# Patient Record
Sex: MALE | Race: WHITE | NOT HISPANIC OR LATINO | Employment: FULL TIME | URBAN - METROPOLITAN AREA
[De-identification: names, ages, dates, MRNs, and addresses within clinical notes are randomized per-mention and may not be internally consistent; named-entity substitution may affect disease eponyms.]

---

## 2017-04-13 ENCOUNTER — OFFICE VISIT - HEALTHEAST (OUTPATIENT)
Dept: FAMILY MEDICINE | Facility: CLINIC | Age: 52
End: 2017-04-13

## 2017-04-13 DIAGNOSIS — I10 ESSENTIAL HYPERTENSION: ICD-10-CM

## 2017-04-13 DIAGNOSIS — Z00.00 ROUTINE ADULT HEALTH MAINTENANCE: ICD-10-CM

## 2017-04-13 DIAGNOSIS — Z23 NEED FOR TDAP VACCINATION: ICD-10-CM

## 2017-04-13 ASSESSMENT — MIFFLIN-ST. JEOR: SCORE: 1776.69

## 2018-02-14 ENCOUNTER — AMBULATORY - HEALTHEAST (OUTPATIENT)
Dept: NURSING | Facility: CLINIC | Age: 53
End: 2018-02-14

## 2018-02-14 DIAGNOSIS — Z23 NEED FOR INFLUENZA VACCINATION: ICD-10-CM

## 2018-03-29 ENCOUNTER — COMMUNICATION - HEALTHEAST (OUTPATIENT)
Dept: FAMILY MEDICINE | Facility: CLINIC | Age: 53
End: 2018-03-29

## 2018-03-29 DIAGNOSIS — I10 ESSENTIAL HYPERTENSION: ICD-10-CM

## 2018-07-01 ENCOUNTER — COMMUNICATION - HEALTHEAST (OUTPATIENT)
Dept: FAMILY MEDICINE | Facility: CLINIC | Age: 53
End: 2018-07-01

## 2018-07-01 DIAGNOSIS — I10 ESSENTIAL HYPERTENSION: ICD-10-CM

## 2018-08-10 ENCOUNTER — COMMUNICATION - HEALTHEAST (OUTPATIENT)
Dept: FAMILY MEDICINE | Facility: CLINIC | Age: 53
End: 2018-08-10

## 2018-08-10 DIAGNOSIS — I10 ESSENTIAL HYPERTENSION: ICD-10-CM

## 2018-08-17 ENCOUNTER — COMMUNICATION - HEALTHEAST (OUTPATIENT)
Dept: FAMILY MEDICINE | Facility: CLINIC | Age: 53
End: 2018-08-17

## 2018-09-05 ENCOUNTER — OFFICE VISIT - HEALTHEAST (OUTPATIENT)
Dept: FAMILY MEDICINE | Facility: CLINIC | Age: 53
End: 2018-09-05

## 2018-09-05 DIAGNOSIS — N52.9 ED (ERECTILE DYSFUNCTION): ICD-10-CM

## 2018-09-05 DIAGNOSIS — Z12.11 SCREEN FOR COLON CANCER: ICD-10-CM

## 2018-09-05 DIAGNOSIS — E66.811 CLASS 1 OBESITY WITHOUT SERIOUS COMORBIDITY WITH BODY MASS INDEX (BMI) OF 30.0 TO 30.9 IN ADULT, UNSPECIFIED OBESITY TYPE: ICD-10-CM

## 2018-09-05 DIAGNOSIS — I10 ESSENTIAL HYPERTENSION: ICD-10-CM

## 2018-09-05 DIAGNOSIS — Z00.00 ROUTINE PHYSICAL EXAMINATION: ICD-10-CM

## 2018-09-05 DIAGNOSIS — J30.2 SEASONAL ALLERGIES: ICD-10-CM

## 2018-09-05 LAB
ALBUMIN SERPL-MCNC: 4.7 G/DL (ref 3.5–5)
ALP SERPL-CCNC: 83 U/L (ref 45–120)
ALT SERPL W P-5'-P-CCNC: 37 U/L (ref 0–45)
ANION GAP SERPL CALCULATED.3IONS-SCNC: 12 MMOL/L (ref 5–18)
AST SERPL W P-5'-P-CCNC: 31 U/L (ref 0–40)
BILIRUB SERPL-MCNC: 0.6 MG/DL (ref 0–1)
BUN SERPL-MCNC: 14 MG/DL (ref 8–22)
CALCIUM SERPL-MCNC: 10.3 MG/DL (ref 8.5–10.5)
CHLORIDE BLD-SCNC: 103 MMOL/L (ref 98–107)
CHOLEST SERPL-MCNC: 196 MG/DL
CO2 SERPL-SCNC: 22 MMOL/L (ref 22–31)
CREAT SERPL-MCNC: 0.89 MG/DL (ref 0.7–1.3)
ERYTHROCYTE [DISTWIDTH] IN BLOOD BY AUTOMATED COUNT: 11.3 % (ref 11–14.5)
FASTING STATUS PATIENT QL REPORTED: ABNORMAL
GFR SERPL CREATININE-BSD FRML MDRD: >60 ML/MIN/1.73M2
GLUCOSE BLD-MCNC: 113 MG/DL (ref 70–125)
HCT VFR BLD AUTO: 49.4 % (ref 40–54)
HDLC SERPL-MCNC: 48 MG/DL
HGB BLD-MCNC: 16.9 G/DL (ref 14–18)
LDLC SERPL CALC-MCNC: 114 MG/DL
MCH RBC QN AUTO: 32.6 PG (ref 27–34)
MCHC RBC AUTO-ENTMCNC: 34.2 G/DL (ref 32–36)
MCV RBC AUTO: 96 FL (ref 80–100)
PLATELET # BLD AUTO: 280 THOU/UL (ref 140–440)
PMV BLD AUTO: 7.9 FL (ref 7–10)
POTASSIUM BLD-SCNC: 4.7 MMOL/L (ref 3.5–5)
PROT SERPL-MCNC: 7.5 G/DL (ref 6–8)
PSA SERPL-MCNC: 2 NG/ML (ref 0–3.5)
RBC # BLD AUTO: 5.18 MILL/UL (ref 4.4–6.2)
SODIUM SERPL-SCNC: 137 MMOL/L (ref 136–145)
TRIGL SERPL-MCNC: 168 MG/DL
WBC: 5.4 THOU/UL (ref 4–11)

## 2018-09-05 ASSESSMENT — MIFFLIN-ST. JEOR: SCORE: 1794.83

## 2018-09-06 ENCOUNTER — AMBULATORY - HEALTHEAST (OUTPATIENT)
Dept: FAMILY MEDICINE | Facility: CLINIC | Age: 53
End: 2018-09-06

## 2018-09-06 ENCOUNTER — COMMUNICATION - HEALTHEAST (OUTPATIENT)
Dept: FAMILY MEDICINE | Facility: CLINIC | Age: 53
End: 2018-09-06

## 2018-09-06 DIAGNOSIS — Z00.00 HEALTH CARE MAINTENANCE: ICD-10-CM

## 2018-10-10 ENCOUNTER — RECORDS - HEALTHEAST (OUTPATIENT)
Dept: ADMINISTRATIVE | Facility: OTHER | Age: 53
End: 2018-10-10

## 2018-11-18 ENCOUNTER — COMMUNICATION - HEALTHEAST (OUTPATIENT)
Dept: FAMILY MEDICINE | Facility: CLINIC | Age: 53
End: 2018-11-18

## 2018-11-18 DIAGNOSIS — I10 ESSENTIAL HYPERTENSION, BENIGN: ICD-10-CM

## 2018-11-21 ENCOUNTER — COMMUNICATION - HEALTHEAST (OUTPATIENT)
Dept: FAMILY MEDICINE | Facility: CLINIC | Age: 53
End: 2018-11-21

## 2019-01-23 ENCOUNTER — COMMUNICATION - HEALTHEAST (OUTPATIENT)
Dept: FAMILY MEDICINE | Facility: CLINIC | Age: 54
End: 2019-01-23

## 2019-01-23 DIAGNOSIS — N52.9 ED (ERECTILE DYSFUNCTION): ICD-10-CM

## 2019-01-25 ENCOUNTER — AMBULATORY - HEALTHEAST (OUTPATIENT)
Dept: NURSING | Facility: CLINIC | Age: 54
End: 2019-01-25

## 2019-01-25 ENCOUNTER — AMBULATORY - HEALTHEAST (OUTPATIENT)
Dept: FAMILY MEDICINE | Facility: CLINIC | Age: 54
End: 2019-01-25

## 2019-01-25 DIAGNOSIS — Z23 NEED FOR SHINGLES VACCINE: ICD-10-CM

## 2019-02-27 ENCOUNTER — COMMUNICATION - HEALTHEAST (OUTPATIENT)
Dept: FAMILY MEDICINE | Facility: CLINIC | Age: 54
End: 2019-02-27

## 2019-03-29 ENCOUNTER — COMMUNICATION - HEALTHEAST (OUTPATIENT)
Dept: FAMILY MEDICINE | Facility: CLINIC | Age: 54
End: 2019-03-29

## 2019-06-07 ENCOUNTER — COMMUNICATION - HEALTHEAST (OUTPATIENT)
Dept: FAMILY MEDICINE | Facility: CLINIC | Age: 54
End: 2019-06-07

## 2019-08-07 ENCOUNTER — COMMUNICATION - HEALTHEAST (OUTPATIENT)
Dept: FAMILY MEDICINE | Facility: CLINIC | Age: 54
End: 2019-08-07

## 2019-09-05 ENCOUNTER — COMMUNICATION - HEALTHEAST (OUTPATIENT)
Dept: SCHEDULING | Facility: CLINIC | Age: 54
End: 2019-09-05

## 2019-09-05 DIAGNOSIS — I10 ESSENTIAL HYPERTENSION, BENIGN: ICD-10-CM

## 2019-09-25 ENCOUNTER — OFFICE VISIT - HEALTHEAST (OUTPATIENT)
Dept: FAMILY MEDICINE | Facility: CLINIC | Age: 54
End: 2019-09-25

## 2019-09-25 DIAGNOSIS — Z00.00 HEALTH CARE MAINTENANCE: ICD-10-CM

## 2019-09-25 DIAGNOSIS — I10 ESSENTIAL HYPERTENSION, BENIGN: ICD-10-CM

## 2019-09-25 DIAGNOSIS — N52.9 ERECTILE DYSFUNCTION, UNSPECIFIED ERECTILE DYSFUNCTION TYPE: ICD-10-CM

## 2019-09-25 LAB
ALBUMIN SERPL-MCNC: 4.7 G/DL (ref 3.5–5)
ALP SERPL-CCNC: 84 U/L (ref 45–120)
ALT SERPL W P-5'-P-CCNC: 35 U/L (ref 0–45)
ANION GAP SERPL CALCULATED.3IONS-SCNC: 12 MMOL/L (ref 5–18)
AST SERPL W P-5'-P-CCNC: 28 U/L (ref 0–40)
BILIRUB SERPL-MCNC: 0.6 MG/DL (ref 0–1)
BUN SERPL-MCNC: 17 MG/DL (ref 8–22)
CALCIUM SERPL-MCNC: 10.4 MG/DL (ref 8.5–10.5)
CHLORIDE BLD-SCNC: 102 MMOL/L (ref 98–107)
CHOLEST SERPL-MCNC: 193 MG/DL
CO2 SERPL-SCNC: 23 MMOL/L (ref 22–31)
CREAT SERPL-MCNC: 1.06 MG/DL (ref 0.7–1.3)
ERYTHROCYTE [DISTWIDTH] IN BLOOD BY AUTOMATED COUNT: 10.7 % (ref 11–14.5)
FASTING STATUS PATIENT QL REPORTED: YES
GFR SERPL CREATININE-BSD FRML MDRD: >60 ML/MIN/1.73M2
GLUCOSE BLD-MCNC: 110 MG/DL (ref 70–125)
HCT VFR BLD AUTO: 46.9 % (ref 40–54)
HDLC SERPL-MCNC: 45 MG/DL
HGB BLD-MCNC: 16.1 G/DL (ref 14–18)
LDLC SERPL CALC-MCNC: 75 MG/DL
MCH RBC QN AUTO: 33.2 PG (ref 27–34)
MCHC RBC AUTO-ENTMCNC: 34.3 G/DL (ref 32–36)
MCV RBC AUTO: 97 FL (ref 80–100)
PLATELET # BLD AUTO: 258 THOU/UL (ref 140–440)
PMV BLD AUTO: 7.8 FL (ref 7–10)
POTASSIUM BLD-SCNC: 4.8 MMOL/L (ref 3.5–5)
PROT SERPL-MCNC: 7.6 G/DL (ref 6–8)
PSA SERPL-MCNC: 2.1 NG/ML (ref 0–3.5)
RBC # BLD AUTO: 4.84 MILL/UL (ref 4.4–6.2)
SODIUM SERPL-SCNC: 137 MMOL/L (ref 136–145)
TRIGL SERPL-MCNC: 364 MG/DL
WBC: 4.8 THOU/UL (ref 4–11)

## 2019-10-01 ENCOUNTER — COMMUNICATION - HEALTHEAST (OUTPATIENT)
Dept: FAMILY MEDICINE | Facility: CLINIC | Age: 54
End: 2019-10-01

## 2019-10-01 DIAGNOSIS — E78.1 HIGH TRIGLYCERIDES: ICD-10-CM

## 2019-10-09 ENCOUNTER — COMMUNICATION - HEALTHEAST (OUTPATIENT)
Dept: FAMILY MEDICINE | Facility: CLINIC | Age: 54
End: 2019-10-09

## 2019-10-09 ENCOUNTER — AMBULATORY - HEALTHEAST (OUTPATIENT)
Dept: NURSING | Facility: CLINIC | Age: 54
End: 2019-10-09

## 2019-10-09 DIAGNOSIS — I10 ESSENTIAL HYPERTENSION, BENIGN: ICD-10-CM

## 2020-03-04 ENCOUNTER — COMMUNICATION - HEALTHEAST (OUTPATIENT)
Dept: FAMILY MEDICINE | Facility: CLINIC | Age: 55
End: 2020-03-04

## 2020-03-04 DIAGNOSIS — N52.9 ED (ERECTILE DYSFUNCTION): ICD-10-CM

## 2020-03-04 DIAGNOSIS — I10 ESSENTIAL HYPERTENSION, BENIGN: ICD-10-CM

## 2020-05-26 ENCOUNTER — COMMUNICATION - HEALTHEAST (OUTPATIENT)
Dept: FAMILY MEDICINE | Facility: CLINIC | Age: 55
End: 2020-05-26

## 2020-05-26 DIAGNOSIS — I10 ESSENTIAL HYPERTENSION, BENIGN: ICD-10-CM

## 2020-11-24 ENCOUNTER — COMMUNICATION - HEALTHEAST (OUTPATIENT)
Dept: FAMILY MEDICINE | Facility: CLINIC | Age: 55
End: 2020-11-24

## 2020-11-24 DIAGNOSIS — I10 ESSENTIAL HYPERTENSION, BENIGN: ICD-10-CM

## 2020-11-24 DIAGNOSIS — N52.9 ED (ERECTILE DYSFUNCTION): ICD-10-CM

## 2020-12-23 ENCOUNTER — OFFICE VISIT - HEALTHEAST (OUTPATIENT)
Dept: FAMILY MEDICINE | Facility: CLINIC | Age: 55
End: 2020-12-23

## 2020-12-23 DIAGNOSIS — I10 ESSENTIAL HYPERTENSION: ICD-10-CM

## 2020-12-23 DIAGNOSIS — Z00.00 ROUTINE HISTORY AND PHYSICAL EXAMINATION OF ADULT: ICD-10-CM

## 2020-12-23 DIAGNOSIS — J30.2 SEASONAL ALLERGIC RHINITIS, UNSPECIFIED TRIGGER: ICD-10-CM

## 2020-12-23 DIAGNOSIS — Z23 NEEDS FLU SHOT: ICD-10-CM

## 2020-12-23 LAB
ALBUMIN SERPL-MCNC: 4.6 G/DL (ref 3.5–5)
ALP SERPL-CCNC: 88 U/L (ref 45–120)
ALT SERPL W P-5'-P-CCNC: 40 U/L (ref 0–45)
ANION GAP SERPL CALCULATED.3IONS-SCNC: 12 MMOL/L (ref 5–18)
AST SERPL W P-5'-P-CCNC: 27 U/L (ref 0–40)
BILIRUB SERPL-MCNC: 0.5 MG/DL (ref 0–1)
BUN SERPL-MCNC: 16 MG/DL (ref 8–22)
CALCIUM SERPL-MCNC: 10 MG/DL (ref 8.5–10.5)
CHLORIDE BLD-SCNC: 103 MMOL/L (ref 98–107)
CHOLEST SERPL-MCNC: 198 MG/DL
CO2 SERPL-SCNC: 24 MMOL/L (ref 22–31)
CREAT SERPL-MCNC: 1.06 MG/DL (ref 0.7–1.3)
ERYTHROCYTE [DISTWIDTH] IN BLOOD BY AUTOMATED COUNT: 11.5 % (ref 11–14.5)
FASTING STATUS PATIENT QL REPORTED: YES
GFR SERPL CREATININE-BSD FRML MDRD: >60 ML/MIN/1.73M2
GLUCOSE BLD-MCNC: 114 MG/DL (ref 70–125)
HCT VFR BLD AUTO: 45.7 % (ref 40–54)
HDLC SERPL-MCNC: 38 MG/DL
HGB BLD-MCNC: 15.8 G/DL (ref 14–18)
LDLC SERPL CALC-MCNC: 95 MG/DL
MCH RBC QN AUTO: 32.9 PG (ref 27–34)
MCHC RBC AUTO-ENTMCNC: 34.6 G/DL (ref 32–36)
MCV RBC AUTO: 95 FL (ref 80–100)
PLATELET # BLD AUTO: 277 THOU/UL (ref 140–440)
PMV BLD AUTO: 8.1 FL (ref 7–10)
POTASSIUM BLD-SCNC: 4.6 MMOL/L (ref 3.5–5)
PROT SERPL-MCNC: 7.5 G/DL (ref 6–8)
PSA SERPL-MCNC: 2.4 NG/ML (ref 0–3.5)
RBC # BLD AUTO: 4.81 MILL/UL (ref 4.4–6.2)
SODIUM SERPL-SCNC: 139 MMOL/L (ref 136–145)
TRIGL SERPL-MCNC: 324 MG/DL
WBC: 4.7 THOU/UL (ref 4–11)

## 2020-12-23 ASSESSMENT — MIFFLIN-ST. JEOR: SCORE: 1807.53

## 2021-01-05 ENCOUNTER — COMMUNICATION - HEALTHEAST (OUTPATIENT)
Dept: FAMILY MEDICINE | Facility: CLINIC | Age: 56
End: 2021-01-05

## 2021-01-05 DIAGNOSIS — E78.2 MIXED HYPERLIPIDEMIA: ICD-10-CM

## 2021-01-07 RX ORDER — EZETIMIBE 10 MG/1
10 TABLET ORAL DAILY
Qty: 90 TABLET | Refills: 1 | Status: SHIPPED | OUTPATIENT
Start: 2021-01-07 | End: 2022-06-22

## 2021-01-26 ENCOUNTER — COMMUNICATION - HEALTHEAST (OUTPATIENT)
Dept: FAMILY MEDICINE | Facility: CLINIC | Age: 56
End: 2021-01-26

## 2021-01-26 DIAGNOSIS — I10 ESSENTIAL HYPERTENSION, BENIGN: ICD-10-CM

## 2021-01-27 RX ORDER — AMLODIPINE BESYLATE 5 MG/1
TABLET ORAL
Qty: 90 TABLET | Refills: 3 | Status: SHIPPED | OUTPATIENT
Start: 2021-01-27 | End: 2022-02-23

## 2021-01-27 RX ORDER — LISINOPRIL 40 MG/1
TABLET ORAL
Qty: 90 TABLET | Refills: 3 | Status: SHIPPED | OUTPATIENT
Start: 2021-01-27 | End: 2022-02-23

## 2021-03-16 ENCOUNTER — COMMUNICATION - HEALTHEAST (OUTPATIENT)
Dept: FAMILY MEDICINE | Facility: CLINIC | Age: 56
End: 2021-03-16

## 2021-03-16 DIAGNOSIS — N52.9 ED (ERECTILE DYSFUNCTION): ICD-10-CM

## 2021-03-17 RX ORDER — SILDENAFIL CITRATE 20 MG/1
TABLET ORAL
Qty: 90 TABLET | Refills: 3 | Status: SHIPPED | OUTPATIENT
Start: 2021-03-17 | End: 2022-03-21

## 2021-05-01 ENCOUNTER — HEALTH MAINTENANCE LETTER (OUTPATIENT)
Age: 56
End: 2021-05-01

## 2021-05-26 VITALS — HEART RATE: 100 BPM | DIASTOLIC BLOOD PRESSURE: 85 MMHG | SYSTOLIC BLOOD PRESSURE: 131 MMHG

## 2021-05-30 VITALS — WEIGHT: 206 LBS | BODY MASS INDEX: 29.49 KG/M2 | HEIGHT: 70 IN

## 2021-06-01 NOTE — TELEPHONE ENCOUNTER
----- Message from Guerrero Mercado MD sent at 9/30/2019  8:11 PM CDT -----  PSA stable- we will continue to check yearly.  Cholesterol levels are at goal range but triglycerides are elevated - I would recommend lowering fat and cholesterol intake in the diet and rechecking levels in 2 months- if still elevated we could discuss medication options.  Kidney and liver function are normal, no signs of diabetes.  No other concerns on blood work.

## 2021-06-01 NOTE — TELEPHONE ENCOUNTER
RN cannot approve Refill Request    Appointment scheduled 9/17/19- requesting short term supply to get him to that appointment    RN can NOT refill this medication PCP messaged that patient is overdue for Office Visit. Last office visit: Visit date not found Last Physical: Visit date not found Last MTM visit: Visit date not found Last visit same specialty: Visit date not found.  Next visit within 3 mo: Visit date not found  Next physical within 3 mo: Visit date not found      Katie Cm [facility] Triage/Med Refill 9/5/2019    Requested Prescriptions   Pending Prescriptions Disp Refills     lisinopril (PRINIVIL,ZESTRIL) 40 MG tablet 90 tablet 2     Sig: Take 1 tablet (40 mg total) by mouth daily.       Ace Inhibitors Refill Protocol Failed - 9/5/2019  4:10 PM        Failed - PCP or prescribing provider visit in past 12 months       Last office visit with prescriber/PCP: Visit date not found OR same dept: Visit date not found OR same specialty: Visit date not found  Last physical: Visit date not found Last MTM visit: Visit date not found   Next visit within 3 mo: Visit date not found  Next physical within 3 mo: Visit date not found  Prescriber OR PCP: Katie Cm RN  Last diagnosis associated with med order: 1. Benign Essential Hypertension  - lisinopril (PRINIVIL,ZESTRIL) 40 MG tablet; Take 1 tablet (40 mg total) by mouth daily.  Dispense: 90 tablet; Refill: 2    If protocol passes may refill for 12 months if within 3 months of last provider visit (or a total of 15 months).             Failed - Blood pressure filed in past 12 months     BP Readings from Last 1 Encounters:   09/05/18 (!) 148/97             Passed - Serum Potassium in past 12 months     No results found for: LN-POTASSIUM          Passed - Serum Creatinine in past 12 months     Creatinine   Date Value Ref Range Status   09/05/2018 0.89 0.70 - 1.30 mg/dL Final

## 2021-06-01 NOTE — TELEPHONE ENCOUNTER
Triage call:   Blood pressure meds- needs refill   Appointment scheduled for 9/17/19    Katie Cm RN [facility] Triage/Med Refill 9/5/2019 4:09 PM

## 2021-06-01 NOTE — TELEPHONE ENCOUNTER
Notified of results and recommendations. Letter mailed with results.  Lab appointment scheduled for 11/27/19.  Order pended for future lipid, please sign.

## 2021-06-01 NOTE — PROGRESS NOTES
ASSESSMENT/PLAN  1. Benign Essential Hypertension  Blood pressure not at goal range  We will continue with lisinopril but add 5 mg amlodipine  Return in 2 weeks for nurse blood pressure check adjust medications accordingly  Refill sent to the pharmacy  - amLODIPine (NORVASC) 5 MG tablet; Take 1 tablet (5 mg total) by mouth daily.  Dispense: 90 tablet; Refill: 1  - lisinopril (PRINIVIL,ZESTRIL) 40 MG tablet; Take 1 tablet (40 mg total) by mouth daily.  Dispense: 90 tablet; Refill: 1    2. Erectile dysfunction, unspecified erectile dysfunction type  Patient uses Revatio for erectile dysfunction  Refills upon request    3. Health care maintenance  Patient is due for fasting blood work which she is fasting today will obtain this and follow-up based on results  Patient is up-to-date on colon cancer screening  We will update with Shingrix No. 2 and flu vaccine today  - Lipid [place] FASTING  - Comprehensive Metabolic Panel  - PSA (Prostatic-Specific Antigen), Annual Screen  - HM2(CBC w/o Differential)        SUBJECTIVE:   Chief Complaint   Patient presents with     Blood Pressure Check     Currently taking Lisinopril 40 mg daily     Matias Rosas 53 y.o. male    Current Outpatient Medications   Medication Sig Dispense Refill     lisinopril (PRINIVIL,ZESTRIL) 40 MG tablet Take 1 tablet (40 mg total) by mouth daily. 90 tablet 1     sildenafil, antihypertensive, (REVATIO) 20 mg tablet TAKE 1 TABLET BY MOUTH ONCE DAILY AS NEEDED 30 tablet 8     ALLEGRA-D 12 HOUR  mg per tablet TAKE 1 TABLET BY MOUTH TWICE DAILY 60 tablet 0     amLODIPine (NORVASC) 5 MG tablet Take 1 tablet (5 mg total) by mouth daily. 90 tablet 1     No current facility-administered medications for this visit.      Allergies: Patient has no known allergies.   No LMP for male patient.    HPI:   Patient is here for medication follow-up  Patient takes Revatio for erectile dysfunction-medication is working well without side effects we will refill upon  request    Patient is on lisinopril 40 mg daily his blood pressure still elevated outside of goal range-discussed goal range of 130/80 with the patient, we will add amlodipine 5 mg daily  Patient is to return in 2 weeks for nurse blood pressure check and will adjust accordingly    Patient is up-to-date on colon cancer screening  Will obtain flu vaccine today and Shingrix No. 2  He is due for fasting blood work which will obtain today and follow-up based on results    ROS: negative except as per HPI    OBJECTIVE:   The patient appears well, alert, oriented x 3, in no distress.  BP (!) 156/95   Pulse 92   Temp 97.3  F (36.3  C) (Oral)   Resp 16   Wt 208 lb 3.2 oz (94.4 kg)   BMI 30.09 kg/m      Lungs: clear, good air entry, no wheezes, rhonchi or rales.   Cardiac: S1 and S2 normal, no murmurs, regular rate and rhythm.   Abdomen: normal bowel sounds, soft BMI 30  Extremities: show no edema, normal peripheral pulses.   Neurological: normal, no focal findings.  Skin: clear, dry, no rashes/lesions  Psych- normal mood and affect      Pt states an understanding and agrees to the above plan.

## 2021-06-02 ENCOUNTER — RECORDS - HEALTHEAST (OUTPATIENT)
Dept: ADMINISTRATIVE | Facility: CLINIC | Age: 56
End: 2021-06-02

## 2021-06-02 VITALS — HEIGHT: 70 IN | WEIGHT: 210 LBS | BODY MASS INDEX: 30.06 KG/M2

## 2021-06-02 NOTE — TELEPHONE ENCOUNTER
Reason contacted:  Bp check  Information relayed:  Relayed message below from   Additional questions:  No  Further follow-up needed:  No  Okay to leave a detailed message:  No

## 2021-06-02 NOTE — TELEPHONE ENCOUNTER
Left message to call back for: BP   Information to relay to patient:  LMTCB, please relay message from Dr Mercado

## 2021-06-02 NOTE — PROGRESS NOTES
Follow Up Blood Pressure Check    Matias Rosas is a 53 y.o. male recommended to follow up for blood pressure check by Guerrero Mercado MD. Anihypertensive medications and adherence were verified: Yes.     Reason for visit: New medication added 9/25/19   1. Benign Essential Hypertension  Blood pressure not at goal range  We will continue with lisinopril but add 5 mg amlodipine  Return in 2 weeks for nurse blood pressure check adjust medications accordingly    Medication change at last visit: yes see above     Today's Vitals:   Vitals:    10/09/19 1107 10/09/19 1117   BP: 137/83 131/85   Patient Site: Right Arm Right Arm   Patient Position: Sitting Sitting   Cuff Size: Adult Large Adult Large   Pulse: 95 100       Home blood pressure readings brought in today:   NO     Lowest blood pressure today is less than 140/90 and they deny signs or symptoms of new onset: severe headache, fatigue, confusion, vision changes, chest pain, pounding in the chest, neck, ears, irregular heartbeat, difficulty breathing and blood in the urine.  Please inform patient of his/her blood pressure today.  If they are asymptomatic, the patient is to continue current medications.  This message will be routed to their provider, and they will be notified if a change in medication is recommended.      Vani Pendleton    Current Outpatient Medications   Medication Sig Dispense Refill     ALLEGRA-D 12 HOUR  mg per tablet TAKE 1 TABLET BY MOUTH TWICE DAILY 60 tablet 0     amLODIPine (NORVASC) 5 MG tablet Take 1 tablet (5 mg total) by mouth daily. 90 tablet 1     lisinopril (PRINIVIL,ZESTRIL) 40 MG tablet Take 1 tablet (40 mg total) by mouth daily. 90 tablet 1     sildenafil, antihypertensive, (REVATIO) 20 mg tablet TAKE 1 TABLET BY MOUTH ONCE DAILY AS NEEDED 30 tablet 8     No current facility-administered medications for this visit.

## 2021-06-02 NOTE — TELEPHONE ENCOUNTER
----- Message from Guerrero Mercado MD sent at 10/9/2019 11:24 AM CDT -----    Blood pressure much improved near goal range- please have patient continue with current dosing.    ----- Message -----  From: Vani Pendleton CMA  Sent: 10/9/2019  11:18 AM CDT  To: Guerrero Mercado MD

## 2021-06-03 VITALS
BODY MASS INDEX: 30.09 KG/M2 | SYSTOLIC BLOOD PRESSURE: 156 MMHG | RESPIRATION RATE: 16 BRPM | HEART RATE: 92 BPM | DIASTOLIC BLOOD PRESSURE: 95 MMHG | WEIGHT: 208.2 LBS | TEMPERATURE: 97.3 F

## 2021-06-05 VITALS
BODY MASS INDEX: 30.46 KG/M2 | RESPIRATION RATE: 16 BRPM | SYSTOLIC BLOOD PRESSURE: 132 MMHG | DIASTOLIC BLOOD PRESSURE: 79 MMHG | HEART RATE: 91 BPM | WEIGHT: 212.8 LBS | TEMPERATURE: 97.6 F | HEIGHT: 70 IN

## 2021-06-06 NOTE — TELEPHONE ENCOUNTER
Refill Approved    Rx renewed per Medication Renewal Policy. Medication was last renewed on 9/25/19.1/25/19.    [facility] [place] Triage/Med Refill 3/5/2020     Requested Prescriptions   Pending Prescriptions Disp Refills     sildenafil (REVATIO) 20 mg tablet [Pharmacy Med Name: Sildenafil Citrate 20 MG Oral Tablet] 30 tablet 0     Sig: TAKE 1 TABLET BY MOUTH ONCE DAILY AS NEEDED       Medications for Impotence Refill Protocol Passed - 3/4/2020  4:14 PM        Passed - PCP or prescribing provider visit in last year     Last office visit with prescriber/PCP: 9/25/2019 Guerrero Mercado MD OR same dept: 9/25/2019 Guerrero Mercado MD OR same specialty: 9/25/2019 Guerrero Mercado MD  Last physical: 9/5/2018 Last MTM visit: Visit date not found   Next visit within 3 mo: Visit date not found  Next physical within 3 mo: Visit date not found  Prescriber OR PCP: Guerrero Mercado MD  Last diagnosis associated with med order: 1. ED (erectile dysfunction)  - sildenafil (REVATIO) 20 mg tablet [Pharmacy Med Name: Sildenafil Citrate 20 MG Oral Tablet]; TAKE 1 TABLET BY MOUTH ONCE DAILY AS NEEDED  Dispense: 30 tablet; Refill: 0    2. Benign Essential Hypertension  - amLODIPine (NORVASC) 5 MG tablet [Pharmacy Med Name: amLODIPine Besylate 5 MG Oral Tablet]; Take 1 tablet by mouth once daily  Dispense: 90 tablet; Refill: 0    If protocol passes may refill for 12 months if within 3 months of last provider visit (or a total of 15 months).             amLODIPine (NORVASC) 5 MG tablet [Pharmacy Med Name: amLODIPine Besylate 5 MG Oral Tablet] 90 tablet 0     Sig: Take 1 tablet by mouth once daily       Calcium-Channel Blockers Protocol Passed - 3/4/2020  4:14 PM        Passed - PCP or prescribing provider visit in past 12 months or next 3 months     Last office visit with prescriber/PCP: 9/25/2019 Guerrero Mercado MD OR same dept: 9/25/2019 Guerrero Mercado MD OR same specialty: 9/25/2019  Guerrero Mercado MD  Last physical: 9/5/2018 Last MTM visit: Visit date not found   Next visit within 3 mo: Visit date not found  Next physical within 3 mo: Visit date not found  Prescriber OR PCP: Guerrero Mercado MD  Last diagnosis associated with med order: 1. ED (erectile dysfunction)  - sildenafil (REVATIO) 20 mg tablet [Pharmacy Med Name: Sildenafil Citrate 20 MG Oral Tablet]; TAKE 1 TABLET BY MOUTH ONCE DAILY AS NEEDED  Dispense: 30 tablet; Refill: 0    2. Benign Essential Hypertension  - amLODIPine (NORVASC) 5 MG tablet [Pharmacy Med Name: amLODIPine Besylate 5 MG Oral Tablet]; Take 1 tablet by mouth once daily  Dispense: 90 tablet; Refill: 0    If protocol passes may refill for 12 months if within 3 months of last provider visit (or a total of 15 months).             Passed - Blood pressure filed in past 12 months     BP Readings from Last 1 Encounters:   10/09/19 131/85

## 2021-06-07 ENCOUNTER — COMMUNICATION - HEALTHEAST (OUTPATIENT)
Dept: FAMILY MEDICINE | Facility: CLINIC | Age: 56
End: 2021-06-07

## 2021-06-07 DIAGNOSIS — J30.2 SEASONAL ALLERGIC RHINITIS, UNSPECIFIED TRIGGER: ICD-10-CM

## 2021-06-07 RX ORDER — MONTELUKAST SODIUM 10 MG/1
TABLET ORAL
Qty: 30 TABLET | Refills: 0 | Status: SHIPPED | OUTPATIENT
Start: 2021-06-07 | End: 2022-03-15

## 2021-06-08 NOTE — TELEPHONE ENCOUNTER
Refill Approved    Rx renewed per Medication Renewal Policy. Medication was last renewed on 9/25/19.    [facility] [place] Triage/Med Refill 5/28/2020     Requested Prescriptions   Pending Prescriptions Disp Refills     lisinopriL (PRINIVIL,ZESTRIL) 40 MG tablet [Pharmacy Med Name: Lisinopril 40 MG Oral Tablet] 90 tablet 0     Sig: Take 1 tablet by mouth once daily       Ace Inhibitors Refill Protocol Passed - 5/26/2020  5:30 AM        Passed - PCP or prescribing provider visit in past 12 months       Last office visit with prescriber/PCP: 9/25/2019 Guerrero Mercado MD OR same dept: 9/25/2019 Guerrero Mercado MD OR same specialty: 9/25/2019 Guerrero Mercado MD  Last physical: 9/5/2018 Last MTM visit: Visit date not found   Next visit within 3 mo: Visit date not found  Next physical within 3 mo: Visit date not found  Prescriber OR PCP: Guerrero Mercado MD  Last diagnosis associated with med order: 1. Benign Essential Hypertension  - lisinopriL (PRINIVIL,ZESTRIL) 40 MG tablet [Pharmacy Med Name: Lisinopril 40 MG Oral Tablet]; Take 1 tablet by mouth once daily  Dispense: 90 tablet; Refill: 0    If protocol passes may refill for 12 months if within 3 months of last provider visit (or a total of 15 months).             Passed - Serum Potassium in past 12 months     Lab Results   Component Value Date    Potassium 4.8 09/25/2019             Passed - Blood pressure filed in past 12 months     BP Readings from Last 1 Encounters:   10/09/19 131/85             Passed - Serum Creatinine in past 12 months     Creatinine   Date Value Ref Range Status   09/25/2019 1.06 0.70 - 1.30 mg/dL Final

## 2021-06-10 NOTE — PROGRESS NOTES
ASSESSMENT & PLAN:    1. Essential hypertension  Blood pressure goal and patient has not had medication for a few days  We will refill medication today  We will check labs when he returns fasting  - lisinopril (PRINIVIL,ZESTRIL) 20 MG tablet; Take 1 tablet (20 mg total) by mouth daily.  Dispense: 90 tablet; Refill: 3    2. Routine adult health maintenance  We will check labs at a future date  Encourage regular exercise and healthy eating to continue  Patient is updated with Tdap vaccine today  - Lipid [place]; Future  - Comprehensive Metabolic Panel; Future  - PSA (Prostatic-Specific Antigen), Annual Screen; Future  - HM2(CBC w/o Differential); Future  - Ambulatory referral for Colonoscopy    3. Need for Tdap vaccination  Up-to-date with immunizations today  - Tdap vaccine,  8yo or older,  IM    Follow based on laboratory results her again in 1 year sooner if needed    There are no Patient Instructions on file for this visit.    Orders Placed This Encounter   Procedures     Tdap vaccine,  8yo or older,  IM     Lipid Cascade     Standing Status:   Future     Standing Expiration Date:   4/13/2018     Order Specific Question:   Fasting is required?     Answer:   Yes     Comprehensive Metabolic Panel     Standing Status:   Future     Standing Expiration Date:   4/13/2018     PSA (Prostatic-Specific Antigen), Annual Screen     Standing Status:   Future     Standing Expiration Date:   4/13/2018     HM2(CBC w/o Differential)     Standing Status:   Future     Standing Expiration Date:   4/13/2018     Ambulatory referral for Colonoscopy     Referral Priority:   Routine     Referral Type:   Colonoscopy     Referral Reason:   Evaluation and Treatment     Referral Location:   [facility]     Requested Specialty:   Gastroenterology     Number of Visits Requested:   1     Medications Discontinued During This Encounter   Medication Reason     lisinopril (PRINIVIL,ZESTRIL) 20 MG tablet Reorder       No Follow-up on  file.    CHIEF COMPLAINT:  Chief Complaint   Patient presents with     Annual Exam     Physical exam - not fasting      Hypertension     Medication check        HISTORY OF PRESENT ILLNESS:  Matias is a 51 y.o. male presenting to the clinic today for a physical examination. He does not have any acute concerns today.     Health Maintenance: He is due for a colonoscopy. He will get updated with a Tdap. He will have a PSA checked.     REVIEW OF SYSTEMS:   He has been taking lisinopril for quite a few years. His blood pressure is well managed on 20 mg daily, but he has not taken the medication for two weeks because he ran out. He denies a cough from the medication. He takes Allegra-D for his allergies. He needs to use the Allegra-D rather than regular Allegra, and he only needs it in the spring. He does not develop sinus infections. He thinks it is the trees that he is the most allergic to, but he was never formally tested. He is not fasting today, but he will return on a day that he is for labs. He has never needed to have any moles removed in the past. His umbilical hernia does not bother him. He denies pain or bulges in the groin. His cholesterol levels were slightly elevated the last time he had a lipid panel checked. All other systems are negative.    PFSH:  He works in car sales. He does not have a family history of colon cancer. He has twin girls that are seniors in high school. His father has diabetes, but his mother is healthy. He has four siblings, and they are healthy. He does cardio three days per week. He used to lift weights, but he has some shoulder problems.     Social History     Social History     Marital status: Single     Spouse name: N/A     Number of children: N/A     Years of education: N/A     Occupational History     Not on file.     Social History Main Topics     Smoking status: Current Some Day Smoker     Types: Cigars     Smokeless tobacco: Former User     Alcohol use Yes      Comment: Social   "    Drug use: Not on file     Sexual activity: Not on file     Other Topics Concern     Not on file     Social History Narrative     No narrative on file       Past Medical History:   Diagnosis Date     Hypertension        Past Surgical History:   Procedure Laterality Date     FINGER SURGERY Left     Left thumb        No Known Allergies    Active Ambulatory Problems     Diagnosis Date Noted     Benign Essential Hypertension      Allergic Rhinitis      Overweight      Neck Strain      Diverticulitis Of Colon      Abdominal Pain In The Left Lower Belly (LLQ)      Hyperlipidemia      Resolved Ambulatory Problems     Diagnosis Date Noted     No Resolved Ambulatory Problems     Past Medical History:   Diagnosis Date     Hypertension        Family History   Problem Relation Age of Onset     Diabetes Father      Hypertension Father        VITALS:  Vitals:    04/13/17 1424 04/13/17 1431   BP: (!) 152/94 138/84   Patient Site: Right Arm Right Arm   Patient Position: Sitting Sitting   Cuff Size: Adult Regular Adult Large   Pulse: 88    Resp: 16    Temp: 98.8  F (37.1  C)    TempSrc: Oral    Weight: 206 lb (93.4 kg)    Height: 5' 9.75\" (1.772 m)      Wt Readings from Last 3 Encounters:   04/13/17 206 lb (93.4 kg)       QUALITY MEASURES:  The following high BMI interventions were performed this visit: encouragement to exercise    PHYSICAL EXAM:  GENERAL APPEARANCE: Alert, cooperative, no distress, appears stated age   LUNGS: Clear to auscultation bilaterally, respirations unlabored   HEART: Regular rate and rhythm, S1 and S2 normal, no murmur, rub, or gallop. No lower extremity edema.   SKIN: Skin color, texture, turgor normal, no rashes or lesions  EYES: PERRL, conjunctiva/corneas clear, EOM's intact   EARS: Normal TM's and external ear canals, both ears   NOSE: Nares normal, mucosa normal, no drainage   THROAT: Lips, mucosa, and tongue normal; teeth and gums normal   BACK: Symmetric, no curvature, ROM normal, no CVA " tenderness   ABDOMEN: Small, reducible umbilical hernia.   MUSCULOSKELETAL: Normal range of motion. No joint swelling or deformity.   EXTREMITIES: Extremities normal, atraumatic, no cyanosis  LYMPH NODES: Cervical and supraclavicular nodes normal   NEUROLOGIC: CNII-XII intact. Normal strength, sensation and reflexes   throughout   PSYCHIATRIC: Normal mood and affect.    ADDITIONAL HISTORY SUMMARIZED (FROM OLD RECORDS OR HISTORY FROM SOMEONE OTHER THAN THE PATIENT OR ANOTHER HEALTHCARE PROVIDER) (2 TOTAL): Reviewed 7/25/2014 note regarding general health and medications.     DECISION TO OBTAIN EXTRA INFORMATION (OLD RECORDS REQUESTED OR HISTORY FROM ANOTHER PERSON OR ACCESSING CARE EVERYWHERE) (1 TOTAL): None.     RADIOLOGY TESTS SUMMARIZED OR ORDERED (XRAY/CT/MRI/DXA) (1 TOTAL): None.    LABS REVIEWED OR ORDERED (1 TOTAL): Labs from 7/25/2014 reviewed. Labs ordered.     MEDICINE TESTS SUMMARIZED OR ORDERED (EKG/ECHO) (1 TOTAL): None.    INDEPENDENT REVIEW OF EKG OR X-RAY (2 EACH): None.     The visit lasted a total of 12 minutes face to face with the patient. Over 50% of the time was spent counseling and educating the patient about general health maintenance.    IMars, am scribing for and in the presence of, Dr. Mercado.    I, Dr. Mercado, personally performed the services described in this documentation, as scribed by Mars Vu in my presence, and it is both accurate and complete.    MEDICATIONS:  Current Outpatient Prescriptions   Medication Sig Dispense Refill     fexofenadine-pseudoephedrine (ALLEGRA-D)  mg per tablet Take 1 tablet by mouth 2 (two) times a day.       lisinopril (PRINIVIL,ZESTRIL) 20 MG tablet Take 1 tablet (20 mg total) by mouth daily. 90 tablet 3     No current facility-administered medications for this visit.          Total Data Points: 3

## 2021-06-13 NOTE — TELEPHONE ENCOUNTER
RN cannot approve Refill Request    RN can NOT refill this medication Protocol failed and NO refill given. Last office visit: 9/25/2019 Guerrero Mercado MD Last Physical: 9/5/2018 Last MTM visit: Visit date not found Last visit same specialty: 9/25/2019 Guerrero Mercado MD.  Next visit within 3 mo: Visit date not found  Next physical within 3 mo: Visit date not found      Noemí Toledo, [facility] Triage/Med Refill 11/25/2020    Requested Prescriptions   Pending Prescriptions Disp Refills     lisinopriL (PRINIVIL,ZESTRIL) 40 MG tablet [Pharmacy Med Name: Lisinopril 40 MG Oral Tablet] 90 tablet 0     Sig: Take 1 tablet by mouth once daily       Ace Inhibitors Refill Protocol Failed - 11/24/2020  5:30 AM        Failed - PCP or prescribing provider visit in past 12 months       Last office visit with prescriber/PCP: 9/25/2019 Guerrero Mercado MD OR same dept: Visit date not found OR same specialty: 9/25/2019 Guerrero Mercado MD  Last physical: 9/5/2018 Last MTM visit: Visit date not found   Next visit within 3 mo: Visit date not found  Next physical within 3 mo: Visit date not found  Prescriber OR PCP: Guerrero Mercado MD  Last diagnosis associated with med order: 1. Benign Essential Hypertension  - lisinopriL (PRINIVIL,ZESTRIL) 40 MG tablet [Pharmacy Med Name: Lisinopril 40 MG Oral Tablet]; Take 1 tablet by mouth once daily  Dispense: 90 tablet; Refill: 0  - amLODIPine (NORVASC) 5 MG tablet [Pharmacy Med Name: amLODIPine Besylate 5 MG Oral Tablet]; Take 1 tablet by mouth once daily  Dispense: 90 tablet; Refill: 0    2. ED (erectile dysfunction)  - sildenafil (REVATIO) 20 mg tablet [Pharmacy Med Name: Sildenafil Citrate 20 MG Oral Tablet]; TAKE 1 TABLET BY MOUTH ONCE DAILY AS NEEDED  Dispense: 90 tablet; Refill: 0    If protocol passes may refill for 12 months if within 3 months of last provider visit (or a total of 15 months).             Failed - Serum Potassium in past 12 months      No results found for: LN-POTASSIUM          Failed - Blood pressure filed in past 12 months     BP Readings from Last 1 Encounters:   10/09/19 131/85             Failed - Serum Creatinine in past 12 months     Creatinine   Date Value Ref Range Status   09/25/2019 1.06 0.70 - 1.30 mg/dL Final                amLODIPine (NORVASC) 5 MG tablet [Pharmacy Med Name: amLODIPine Besylate 5 MG Oral Tablet] 90 tablet 0     Sig: Take 1 tablet by mouth once daily       Calcium-Channel Blockers Protocol Failed - 11/24/2020  5:30 AM        Failed - PCP or prescribing provider visit in past 12 months or next 3 months     Last office visit with prescriber/PCP: 9/25/2019 Guerrero Mercado MD OR same dept: Visit date not found OR same specialty: 9/25/2019 Guerrero Mercado MD  Last physical: 9/5/2018 Last MTM visit: Visit date not found   Next visit within 3 mo: Visit date not found  Next physical within 3 mo: Visit date not found  Prescriber OR PCP: Guerrero Mercado MD  Last diagnosis associated with med order: 1. Benign Essential Hypertension  - lisinopriL (PRINIVIL,ZESTRIL) 40 MG tablet [Pharmacy Med Name: Lisinopril 40 MG Oral Tablet]; Take 1 tablet by mouth once daily  Dispense: 90 tablet; Refill: 0  - amLODIPine (NORVASC) 5 MG tablet [Pharmacy Med Name: amLODIPine Besylate 5 MG Oral Tablet]; Take 1 tablet by mouth once daily  Dispense: 90 tablet; Refill: 0    2. ED (erectile dysfunction)  - sildenafil (REVATIO) 20 mg tablet [Pharmacy Med Name: Sildenafil Citrate 20 MG Oral Tablet]; TAKE 1 TABLET BY MOUTH ONCE DAILY AS NEEDED  Dispense: 90 tablet; Refill: 0    If protocol passes may refill for 12 months if within 3 months of last provider visit (or a total of 15 months).             Failed - Blood pressure filed in past 12 months     BP Readings from Last 1 Encounters:   10/09/19 131/85                sildenafil (REVATIO) 20 mg tablet [Pharmacy Med Name: Sildenafil Citrate 20 MG Oral Tablet] 90 tablet 0     Sig:  TAKE 1 TABLET BY MOUTH ONCE DAILY AS NEEDED       Medications for Impotence Refill Protocol Failed - 11/24/2020  5:30 AM        Failed - PCP or prescribing provider visit in last year     Last office visit with prescriber/PCP: 9/25/2019 Guerrero Mercado MD OR same dept: Visit date not found OR same specialty: 9/25/2019 Guerrero Mercado MD  Last physical: 9/5/2018 Last MTM visit: Visit date not found   Next visit within 3 mo: Visit date not found  Next physical within 3 mo: Visit date not found  Prescriber OR PCP: Guerrero Mercado MD  Last diagnosis associated with med order: 1. Benign Essential Hypertension  - lisinopriL (PRINIVIL,ZESTRIL) 40 MG tablet [Pharmacy Med Name: Lisinopril 40 MG Oral Tablet]; Take 1 tablet by mouth once daily  Dispense: 90 tablet; Refill: 0  - amLODIPine (NORVASC) 5 MG tablet [Pharmacy Med Name: amLODIPine Besylate 5 MG Oral Tablet]; Take 1 tablet by mouth once daily  Dispense: 90 tablet; Refill: 0    2. ED (erectile dysfunction)  - sildenafil (REVATIO) 20 mg tablet [Pharmacy Med Name: Sildenafil Citrate 20 MG Oral Tablet]; TAKE 1 TABLET BY MOUTH ONCE DAILY AS NEEDED  Dispense: 90 tablet; Refill: 0    If protocol passes may refill for 12 months if within 3 months of last provider visit (or a total of 15 months).

## 2021-06-14 NOTE — PROGRESS NOTES
Assessment:      Healthy male exam.    HCM  HTN  Seasonal allergies  Plan:       All questions answered.   HCM-patient will update with flu shot today, obtain fasting blood work and follow-up based on results  HTN-blood pressure goal range continue with current medications check electrolytes and kidney function  Seasonal allergies-patient has some success with Allegra-D we discussed Singulair as a trial  Subjective:      Matias Rosas is a 55 y.o. male who presents for an annual exam.  Patient is here for his annual exam.  Patient has no acute concerns.  We do discuss his allergies and he feels he has some control with Allegra-D we discussed how this can elevate blood pressure and we will try a trial on Singulair.  Blood pressure is at goal range we will continue with lisinopril and amlodipine.  Check kidney function and electrolytes.  Patient uses periodic sildenafil for erectile dysfunction.  Update with a flu vaccine today.    Healthy Habits:   Regular Exercise: Yes  Healthy Diet: Yes  Seat Belt: Yes  Sexually active: Yes  Colonoscopy: Yes  Lipid Profile: Yes  Glucose Screen: Yes        Immunization History   Administered Date(s) Administered     INFLUENZA,RECOMBINANT,INJ,PF QUADRIVALENT 18+YRS 01/25/2019, 09/25/2019     Influenza,seasonal,quad inj =/> 6months 02/14/2018     Td,adult,historic,unspecified 03/31/2007     Tdap 04/13/2017     ZOSTER, RECOMBINANT, IM 01/25/2019, 09/25/2019     Immunization status: up to date and documented.    No exam data present    Current Outpatient Medications   Medication Sig Dispense Refill     ALLEGRA-D 12 HOUR  mg per tablet TAKE 1 TABLET BY MOUTH TWICE DAILY 60 tablet 0     amLODIPine (NORVASC) 5 MG tablet Take 1 tablet by mouth once daily 90 tablet 0     lisinopriL (PRINIVIL,ZESTRIL) 40 MG tablet Take 1 tablet by mouth once daily 90 tablet 0     sildenafil (REVATIO) 20 mg tablet TAKE 1 TABLET BY MOUTH ONCE DAILY AS NEEDED 90 tablet 0     montelukast (SINGULAIR) 10 mg  tablet Take 1 tablet (10 mg total) by mouth daily. 30 tablet 2     No current facility-administered medications for this visit.      Past Medical History:   Diagnosis Date     Hypertension      Past Surgical History:   Procedure Laterality Date     FINGER SURGERY Left     Left thumb      Patient has no known allergies.  Family History   Problem Relation Age of Onset     Diabetes Father      Hypertension Father      Social History     Socioeconomic History     Marital status: Single     Spouse name: Not on file     Number of children: Not on file     Years of education: Not on file     Highest education level: Not on file   Occupational History     Not on file   Social Needs     Financial resource strain: Not on file     Food insecurity     Worry: Not on file     Inability: Not on file     Transportation needs     Medical: Not on file     Non-medical: Not on file   Tobacco Use     Smoking status: Current Some Day Smoker     Types: Cigars     Smokeless tobacco: Former User   Substance and Sexual Activity     Alcohol use: Yes     Comment: Social      Drug use: Not on file     Sexual activity: Not on file   Lifestyle     Physical activity     Days per week: Not on file     Minutes per session: Not on file     Stress: Not on file   Relationships     Social connections     Talks on phone: Not on file     Gets together: Not on file     Attends Latter day service: Not on file     Active member of club or organization: Not on file     Attends meetings of clubs or organizations: Not on file     Relationship status: Not on file     Intimate partner violence     Fear of current or ex partner: Not on file     Emotionally abused: Not on file     Physically abused: Not on file     Forced sexual activity: Not on file   Other Topics Concern     Not on file   Social History Narrative     Not on file       Review of Systems  General:  Denies problem  Eyes: Denies problem  Ears/Nose/Throat: Denies problem  Cardiovascular: Denies  "problem  Respiratory:  Denies problem  Gastrointestinal:  Denies problem  Genitourinary: Denies problem  Musculoskeletal:  Denies problem  Skin: Denies problem  Neurologic: Denies problem  Psychiatric: Denies problem  Endocrine: Denies problem  Heme/Lymphatic: Denies problem   Allergic/Immunologic: Denies problem        Objective:     Vitals:    12/23/20 0800 12/23/20 0809   BP: (!) 150/93 132/79   Pulse: 91    Resp: 16    Temp: 97.6  F (36.4  C)    TempSrc: Oral    Weight: 212 lb 12.8 oz (96.5 kg)    Height: 5' 9.75\" (1.772 m)      Body mass index is 30.75 kg/m .    Physical  General Appearance: Alert, cooperative, no distress, appears stated age  Head: Normocephalic, without obvious abnormality, atraumatic  Eyes: PERRL, conjunctiva/corneas clear, EOM's intact  Ears: Normal TM's and external ear canals, both ears  Back: Symmetric, no curvature, ROM normal, no CVA tenderness  Lungs: Clear to auscultation bilaterally, respirations unlabored  Heart: Regular rate and rhythm, S1 and S2 normal, no murmur, rub, or gallop,  Abdomen: Soft, non-tender, bowel sounds active  Musculoskeletal: Normal range of motion. No joint swelling or deformity.   Extremities: Extremities normal, atraumatic, no cyanosis or edema  Skin: Skin color, texture, turgor normal, no rashes or lesions  Neurologic: He is alert. He has normal reflexes.   Psychiatric: He has a normal mood and affect.            "

## 2021-06-14 NOTE — TELEPHONE ENCOUNTER
Spoke to pt and relayed MD message. Pt agrees to start medication. Please send to [facility] listed in chart

## 2021-06-14 NOTE — TELEPHONE ENCOUNTER
----- Message from Guerrero Mercado MD sent at 1/4/2021  1:34 PM CST -----  Please inform patient that PSA stable- we will monitor yearly.  Kidney and liver function are normal.  Glucose is in the pre-diabetic range- similar to previous couple of years- continue to get regular exercise and lower carbohydrates in the diet will improve this..  Cholesterol levels are at the upper limits of normal- trigylcerides are elevated- ratio of total to HDL is 5:1- would recommend starting medication to help lower both- if he is in agreement I will send to pharmacy.

## 2021-06-14 NOTE — TELEPHONE ENCOUNTER
Left message to call back for: providers message/lab results   Information to relay to patient:  Please relay message below to patient from provider, and let provider know if patient is in agreement about starting medication to lower cholesterol.

## 2021-06-14 NOTE — TELEPHONE ENCOUNTER
Refill Approved    Rx renewed per Medication Renewal Policy. Medication was last renewed on 11/25/20.    Noem[facility] Toledo, [facility] Triage/Med Refill 1/27/2021     Requested Prescriptions   Pending Prescriptions Disp Refills     amLODIPine (NORVASC) 5 MG tablet [Pharmacy Med Name: amLODIPine Besylate 5 MG Oral Tablet] 90 tablet 0     Sig: Take 1 tablet by mouth once daily       Calcium-Channel Blockers Protocol Passed - 1/26/2021  2:30 PM        Passed - PCP or prescribing provider visit in past 12 months or next 3 months     Last office visit with prescriber/PCP: 9/25/2019 Guerrero Mercado MD OR same dept: Visit date not found OR same specialty: 9/25/2019 Guerrero Mercado MD  Last physical: 12/23/2020 Last MTM visit: Visit date not found   Next visit within 3 mo: Visit date not found  Next physical within 3 mo: Visit date not found  Prescriber OR PCP: Guerrero Mercado MD  Last diagnosis associated with med order: 1. Benign Essential Hypertension  - amLODIPine (NORVASC) 5 MG tablet [Pharmacy Med Name: amLODIPine Besylate 5 MG Oral Tablet]; Take 1 tablet by mouth once daily  Dispense: 90 tablet; Refill: 0  - lisinopriL (PRINIVIL,ZESTRIL) 40 MG tablet [Pharmacy Med Name: Lisinopril 40 MG Oral Tablet]; Take 1 tablet by mouth once daily  Dispense: 90 tablet; Refill: 0    If protocol passes may refill for 12 months if within 3 months of last provider visit (or a total of 15 months).             Passed - Blood pressure filed in past 12 months     BP Readings from Last 1 Encounters:   12/23/20 132/79                lisinopriL (PRINIVIL,ZESTRIL) 40 MG tablet [Pharmacy Med Name: Lisinopril 40 MG Oral Tablet] 90 tablet 0     Sig: Take 1 tablet by mouth once daily       Ace Inhibitors Refill Protocol Passed - 1/26/2021  2:30 PM        Passed - PCP or prescribing provider visit in past 12 months       Last office visit with prescriber/PCP: 9/25/2019 Guerrero Mercado MD OR same dept: Visit date not  found OR same specialty: 9/25/2019 Guerrero Mercado MD  Last physical: 12/23/2020 Last MTM visit: Visit date not found   Next visit within 3 mo: Visit date not found  Next physical within 3 mo: Visit date not found  Prescriber OR PCP: Guerrero Mercado MD  Last diagnosis associated with med order: 1. Benign Essential Hypertension  - amLODIPine (NORVASC) 5 MG tablet [Pharmacy Med Name: amLODIPine Besylate 5 MG Oral Tablet]; Take 1 tablet by mouth once daily  Dispense: 90 tablet; Refill: 0  - lisinopriL (PRINIVIL,ZESTRIL) 40 MG tablet [Pharmacy Med Name: Lisinopril 40 MG Oral Tablet]; Take 1 tablet by mouth once daily  Dispense: 90 tablet; Refill: 0    If protocol passes may refill for 12 months if within 3 months of last provider visit (or a total of 15 months).             Passed - Serum Potassium in past 12 months     Lab Results   Component Value Date    Potassium 4.6 12/23/2020             Passed - Blood pressure filed in past 12 months     BP Readings from Last 1 Encounters:   12/23/20 132/79             Passed - Serum Creatinine in past 12 months     Creatinine   Date Value Ref Range Status   12/23/2020 1.06 0.70 - 1.30 mg/dL Final

## 2021-06-16 PROBLEM — N52.9 ED (ERECTILE DYSFUNCTION): Status: ACTIVE | Noted: 2018-09-05

## 2021-06-16 PROBLEM — E66.811 CLASS 1 OBESITY WITHOUT SERIOUS COMORBIDITY WITH BODY MASS INDEX (BMI) OF 30.0 TO 30.9 IN ADULT, UNSPECIFIED OBESITY TYPE: Status: ACTIVE | Noted: 2018-09-05

## 2021-06-16 NOTE — TELEPHONE ENCOUNTER
Refill Approved    Rx renewed per Medication Renewal Policy. Medication was last renewed on 11/25/20.    Maik Villa, [facility] Triage/Med Refill 3/17/2021     Requested Prescriptions   Pending Prescriptions Disp Refills     sildenafil (REVATIO) 20 mg tablet [Pharmacy Med Name: Sildenafil Citrate 20 MG Oral Tablet] 90 tablet 0     Sig: TAKE 1 TABLET BY MOUTH ONCE DAILY AS NEEDED       Medications for Impotence Refill Protocol Passed - 3/16/2021  5:30 AM        Passed - PCP or prescribing provider visit in last year     Last office visit with prescriber/PCP: 9/25/2019 Guerrero Mercado MD OR same dept: Visit date not found OR same specialty: 9/25/2019 Guerrero Mercado MD  Last physical: 12/23/2020 Last MTM visit: Visit date not found   Next visit within 3 mo: Visit date not found  Next physical within 3 mo: Visit date not found  Prescriber OR PCP: Guerrero Mercado MD  Last diagnosis associated with med order: 1. ED (erectile dysfunction)  - sildenafil (REVATIO) 20 mg tablet [Pharmacy Med Name: Sildenafil Citrate 20 MG Oral Tablet]; TAKE 1 TABLET BY MOUTH ONCE DAILY AS NEEDED  Dispense: 90 tablet; Refill: 0    If protocol passes may refill for 12 months if within 3 months of last provider visit (or a total of 15 months).

## 2021-06-19 NOTE — LETTER
Letter by Guerrero Mercado MD at      Author: Guerrero Mercado MD Service: -- Author Type: --    Filed:  Encounter Date: 6/7/2019 Status: (Other)       Matias Rosas  [address] [place] [address]   [facility] [address]                 June 7, 2019      Dear Matias:     At your previous appointment with us your blood pressure was elevated and I would like for you to schedule a nurse only appointment to recheck your blood pressure.    We have included a personalized hypertension report card on the following page that lists your most recent blood pressure readings along with your ideal values. Please message us through [employer] or call us at [phone] to schedule your nurse only appointment.    Thank you for including us as members of your health care team.      Sincerely,         Guerrero Mercado MD    Enclosure    Hypertension Report Card for Matias Rosas  June 7, 2019:     Below is a summary of recent tests related to your hypertension.     Blood Pressure:   Normal blood pressure values are 120/80 or lower. Your blood pressure values should be less than 120/80.      Your most recent blood pressure readings at our clinic were:   BP Readings from Last 3 Encounters:   09/05/18 (!) 148/97   04/13/17 138/84

## 2021-06-19 NOTE — LETTER
Letter by Guerrero Mercado MD at      Author: Guerrero Mercado MD Service: -- Author Type: --    Filed:  Encounter Date: 8/7/2019 Status: (Other)       Matias Rosas  [address] [place] [address]   [facility] [address]    August 7, 2019    Dear Matias    In reviewing your records, we have determined a gap in your preventive services. Based on your age and health history, we recommend the follow service.     ? General Physical  ? Physical with a Pap Smear  ? Colon cancer screening  ? Mammogram  ? Immunization  ? Diabetic check  ? Blood pressure  ? Asthma check  ? Cholesterol test  ? Lab work  ? Med check    If you have had the service elsewhere, please contact us so we can update our records. Please let us know if you have transferred your care to another clinic.    Please call [phone] to schedule a nurse only appointment.    We believe that a strong preventive care program, including regular physicals and follow-up care is an important part of a healthy lifestyle and we are committed to helping you maintain your health.    Thank you for choosing us as your health care provider.    Sincerely,   [facility]  [address] [facility] [address]  Phone Number:  [phone]

## 2021-06-19 NOTE — LETTER
Letter by Guerrero Mercado MD at      Author: Guerrero Mercado MD Service: -- Author Type: --    Filed:  Encounter Date: 10/1/2019 Status: Signed         Matias Rosas  [facility]   [facility] [address]             October 1, 2019         Dear Mr. Rosas,    Below are the results from your recent visit:    Resulted Orders   Lipid Kay FASTING   Result Value Ref Range    Cholesterol 193 <=199 mg/dL    Triglycerides 364 (H) <=149 mg/dL    HDL Cholesterol 45 >=40 mg/dL    LDL Calculated 75 <=129 mg/dL    Patient Fasting > 8hrs? Yes    Comprehensive Metabolic Panel   Result Value Ref Range    Sodium 137 136 - 145 mmol/L    Potassium 4.8 3.5 - 5.0 mmol/L    Chloride 102 98 - 107 mmol/L    CO2 23 22 - 31 mmol/L    Anion Gap, Calculation 12 5 - 18 mmol/L    Glucose 110 70 - 125 mg/dL    BUN 17 8 - 22 mg/dL    Creatinine 1.06 0.70 - 1.30 mg/dL    GFR MDRD Af Amer >60 >60 mL/min/1.73m2    GFR MDRD Non Af Amer >60 >60 mL/min/1.73m2    Bilirubin, Total 0.6 0.0 - 1.0 mg/dL    Calcium 10.4 8.5 - 10.5 mg/dL    Protein, Total 7.6 6.0 - 8.0 g/dL    Albumin 4.7 3.5 - 5.0 g/dL    Alkaline Phosphatase 84 45 - 120 U/L    AST 28 0 - 40 U/L    ALT 35 0 - 45 U/L    Narrative    Fasting Glucose reference range is 70-99 mg/dL per  American Diabetes Association (ADA) guidelines.   PSA (Prostatic-Specific Antigen), Annual Screen   Result Value Ref Range    PSA 2.1 0.0 - 3.5 ng/mL    Narrative    Method is Abbott Prostate-Specific Antigen (PSA)  Standard-WHO 1st International (90:10)   HM2(CBC w/o Differential)   Result Value Ref Range    WBC 4.8 4.0 - 11.0 thou/uL    RBC 4.84 4.40 - 6.20 mill/uL    Hemoglobin 16.1 14.0 - 18.0 g/dL    Hematocrit 46.9 40.0 - 54.0 %    MCV 97 80 - 100 fL    MCH 33.2 27.0 - 34.0 pg    MCHC 34.3 32.0 - 36.0 g/dL    RDW 10.7 (L) 11.0 - 14.5 %    Platelets 258 140 - 440 thou/uL    MPV 7.8 7.0 - 10.0 fL       PSA stable- we will continue to check yearly.  Cholesterol levels are at goal range  but triglycerides are elevated - I would recommend lowering fat and cholesterol intake in the diet and rechecking levels in 2 months- if still elevated we could discuss medication options.  Kidney and liver function are normal, no signs of diabetes.  No other concerns on blood work.    You have a lab appointment scheduled for 11/27/2019 at 9:00 am.     Please call with questions or contact us using [employer].    Sincerely,        Electronically signed by Guerrero Mercado MD

## 2021-06-19 NOTE — LETTER
Letter by Guerrero Mercado MD at      Author: Guerrero Mercado MD Service: -- Author Type: --    Filed:  Encounter Date: 3/29/2019 Status: (Other)       Matias Rosas  [address] [place] [address] [place] [address]                 March 29, 2019      Dear Matias:     At your previous appointment with us your blood pressure was elevated and I would like for you to schedule a nurse only appointment to recheck your blood pressure.    We have included a personalized hypertension report card on the following page that lists your most recent blood pressure readings along with your ideal values. Please message us through [facility] or call us at [phone] to schedule your nurse only appointment.    Thank you for including us as members of your health care team.      Sincerely,         Guerrero Mercado MD    Enclosure    Hypertension Report Card for Matias Rosas  March 29, 2019:     Below is a summary of recent tests related to your hypertension.     Blood Pressure:   Normal blood pressure values are 120/80 or lower. Your blood pressure values should be less than 120/80.      Your most recent blood pressure readings at our clinic were:   BP Readings from Last 3 Encounters:   09/05/18 (!) 148/97   04/13/17 138/84

## 2021-06-20 NOTE — PROGRESS NOTES
Assessment:      Healthy male exam.    HCM  Obesity  ED  HTN  Seasonal allergies  Plan:       All questions answered.   HCM -patient is up-to-date on immunizations but will check with insurance about coverage for the new shingles vaccine, will obtain fasting lab work today and follow-up based on results, patient is due for colon cancer screening and we discussed options for him today  Obesity-discussed patient's weight and the need for weight loss, discussed diet and exercise  ED-patient's been having increasing problems with erectile dysfunction discussed with him different medication options today we have printed out a prescription for Revatio-discussed with him possible side effects of medication  HTN-blood pressure not at goal range elevated-discussed with new goal parameters of 130/80 or less-we will increase lisinopril to 40 mg and have him return for nurse blood pressure check in 2 weeks and will adjust medications accordingly  Seasonal allergies-during this time year patient seasonally takes Allegra D1-2 times daily based on his need-he like a prescription of the sent to the pharmacy-did discuss the patient sparingly using this medication as it does elevate blood pressure  Subjective:      Matias Rosas is a 52 y.o. male who presents for an annual exam. The patient reports that there is not domestic violence in his life.  She is here for annual check.  He is fasting interest in fasting labs.  He is due for colon cancer screening.  He is inquiring about the new shingles vaccine which we discussed today.  He will check with insurance about coverage.  Patient presents with blood pressure that is not at goal range and we discussed increasing his lisinopril dosing.    Patient is been having problems with erectile dysfunction and we discussed different medication options for him today-we will print out a prescription for Revatio and discussed the possible side effects of the medication.    Patient has not been  exercising on a regular basis we discussed the need for weight loss we discussed diet and exercise with him today.  He is aware of the need for weight loss we discussed the different positive effects will have on his multitude of different medical conditions including his blood pressure erectile dysfunction and obesity.    Patient has problems with seasonal allergies and periodically uses Allegra D-discussed with him infrequent use of this medication as it does elevate blood pressure.    Healthy Habits:   Regular Exercise: No  Sunscreen Use: Yes  Healthy Diet: Yes  Dental Visits Regularly: Yes  Seat Belt: Yes  Sexually active: Yes  Colonoscopy: Yes  Lipid Profile: Yes  Glucose Screen: Yes        Immunization History   Administered Date(s) Administered     Influenza, seasonal,quad inj 36+ mos 02/14/2018     Td,adult,historic,unspecified 03/31/2007     Tdap 04/13/2017     Immunization status: up to date and documented.    No exam data present    Current Outpatient Prescriptions   Medication Sig Dispense Refill     fexofenadine-pseudoephedrine (ALLEGRA-D)  mg per tablet Take 1 tablet by mouth 2 (two) times a day. 60 tablet 0     lisinopril (PRINIVIL,ZESTRIL) 40 MG tablet Take 1 tablet (40 mg total) by mouth daily. 90 tablet 0     sildenafil, antihypertensive, (REVATIO) 20 mg tablet Take 1 tablet (20 mg total) by mouth daily as needed. 30 tablet 0     No current facility-administered medications for this visit.      Past Medical History:   Diagnosis Date     Hypertension      Past Surgical History:   Procedure Laterality Date     FINGER SURGERY Left     Left thumb      Review of patient's allergies indicates no known allergies.  Family History   Problem Relation Age of Onset     Diabetes Father      Hypertension Father      Social History     Social History     Marital status: Single     Spouse name: N/A     Number of children: N/A     Years of education: N/A     Occupational History     Not on file.     Social  "History Main Topics     Smoking status: Current Some Day Smoker     Types: Cigars     Smokeless tobacco: Former User     Alcohol use Yes      Comment: Social      Drug use: Not on file     Sexual activity: Not on file     Other Topics Concern     Not on file     Social History Narrative       Review of Systems  General:  Denies problem  Eyes: Denies problem  Ears/Nose/Throat: Denies problem  Cardiovascular: Denies problem  Respiratory:  Denies problem  Gastrointestinal:  Denies problem  Genitourinary: Denies problem  Musculoskeletal:  Denies problem  Skin: Denies problem  Neurologic: Denies problem  Psychiatric: Denies problem  Endocrine: Denies problem  Heme/Lymphatic: Denies problem   Allergic/Immunologic: Denies problem        Objective:     Vitals:    09/05/18 0953 09/05/18 1008   BP: (!) 156/99 (!) 148/97   Pulse: 84    Resp: 16    Temp: 97.8  F (36.6  C)    TempSrc: Oral    Weight: 210 lb (95.3 kg)    Height: 5' 9.75\" (1.772 m)      Body mass index is 30.35 kg/(m^2).    Physical  General Appearance: Alert, cooperative, no distress, appears stated age  Head: Normocephalic, without obvious abnormality, atraumatic  Eyes: PERRL, conjunctiva/corneas clear, EOM's intact  Ears: Normal TM's and external ear canals, both ears  Nose: Nares normal, septum midline,mucosa normal, no drainage  Throat: Lips, mucosa, and tongue normal; teeth and gums normal  Neck: Supple, symmetrical, trachea midline, no adenopathy;  thyroid: not enlarged, symmetric, no tenderness/mass/nodules; no carotid bruit or JVD  Back: Symmetric, no curvature, ROM normal, no CVA tenderness  Lungs: Clear to auscultation bilaterally, respirations unlabored  Heart: Regular rate and rhythm, S1 and S2 normal, no murmur, rub, or gallop,  Abdomen: Soft, non-tender, bowel sounds active all four quadrants,  no masses, no organomegaly,small reducible umbilical hernia, obese BMI 30  Genitourinary: deferred, no concerns  Musculoskeletal: Normal range of motion. No " joint swelling or deformity.   Extremities: Extremities normal, atraumatic, no cyanosis or edema  Skin: Skin color, texture, turgor normal, no rashes or lesions  Lymph nodes: Cervical, supraclavicular, and axillary nodes normal  Neurologic: He is alert. He has normal reflexes.   Psychiatric: He has a normal mood and affect.

## 2021-06-23 NOTE — TELEPHONE ENCOUNTER
Refill Approved    Rx renewed per Medication Renewal Policy. Medication was last renewed on 11/27/18.    Rose Higginbotham, [facility] Triage/Med Refill 1/25/2019     Requested Prescriptions   Pending Prescriptions Disp Refills     sildenafil, antihypertensive, (REVATIO) 20 mg tablet [Pharmacy Med Name: SILDENAFIL 20MG TAB] 30 tablet 0     Sig: TAKE 1 TABLET BY MOUTH ONCE DAILY AS NEEDED    Medications for Impotence Refill Protocol Passed - 1/25/2019 10:15 AM       Passed - PCP or prescribing provider visit in last year    Last office visit with prescriber/PCP: 4/13/2017 Guerrero Mercado MD OR same dept: Visit date not found OR same specialty: 4/13/2017 Guerrero Mercado MD  Last physical: 9/5/2018 Last MTM visit: Visit date not found   Next visit within 3 mo: Visit date not found  Next physical within 3 mo: Visit date not found  Prescriber OR PCP: Guerrero Mercado MD  Last diagnosis associated with med order: There are no diagnoses linked to this encounter.  If protocol passes may refill for 12 months if within 3 months of last provider visit (or a total of 15 months).

## 2021-06-23 NOTE — TELEPHONE ENCOUNTER
Patient Returning Call  Reason for call:  Pharmacy called back.  Information relayed to patient:  n/a  Patient has additional questions:  Yes  If YES, what are your questions/concerns:  Pharmacy states the patient is wanting this filled today as he is out of the medication.  Okay to leave a detailed message?: Yes

## 2021-06-24 NOTE — TELEPHONE ENCOUNTER
Controlled Substance Refill Request  Medication:   Requested Prescriptions     Pending Prescriptions Disp Refills     ALLEGRA-D 12 HOUR  mg per tablet [Pharmacy Med Name: ALLEGRA D 12 HR 60-120MG TAB] 60 tablet 0     Sig: TAKE 1 TABLET BY MOUTH TWICE DAILY     Date Last Fill: 9/5/18  Pharmacy: [id number]   Submit electronically to pharmacy  Controlled Substance Agreement on File:   Encounter-Level CSA Scan Date:    There are no encounter-level csa scan date.       Last office visit: Last office visit pertaining to requested medication was 9/5/18.

## 2021-06-25 ENCOUNTER — COMMUNICATION - HEALTHEAST (OUTPATIENT)
Dept: FAMILY MEDICINE | Facility: CLINIC | Age: 56
End: 2021-06-25

## 2021-06-25 DIAGNOSIS — E78.2 MIXED HYPERLIPIDEMIA: ICD-10-CM

## 2021-06-25 RX ORDER — EZETIMIBE 10 MG/1
TABLET ORAL
Qty: 90 TABLET | Refills: 1 | Status: SHIPPED | OUTPATIENT
Start: 2021-06-25 | End: 2022-06-22

## 2021-06-25 NOTE — TELEPHONE ENCOUNTER
RN cannot approve Refill Request    RN can NOT refill this medication med is not covered by policy/route to provider. Last office visit: 9/25/2019 Guerrero Mercado MD Last Physical: 12/23/2020 Last MTM visit: Visit date not found Last visit same specialty: 9/25/2019 Guerrero Mercado MD.  Next visit within 3 mo: Visit date not found  Next physical within 3 mo: Visit date not found      Ene Almazan, [facility] Triage/Med Refill 6/7/2021    Requested Prescriptions   Pending Prescriptions Disp Refills     montelukast (SINGULAIR) 10 mg tablet [Pharmacy Med Name: MONTELUKAST 10MG TAB] 30 tablet 0     Sig: Take 1 tablet by mouth once daily       There is no refill protocol information for this order

## 2021-07-07 NOTE — TELEPHONE ENCOUNTER
Refill Approved    Rx renewed per Medication Renewal Policy.     Ene Almazan, [facility] Triage/Med Refill 6/25/2021     Requested Prescriptions   Signed Prescriptions Disp Refills    ezetimibe (ZETIA) 10 mg tablet 90 tablet 1     Sig: Take 1 tablet by mouth once daily       Lovaza/Ezetimibe-Combination Refill Protocol Passed - 6/25/2021  5:30 AM        Passed - Fasting lipid cascade in last 12 months     Cholesterol   Date Value Ref Range Status   12/23/2020 198 <=199 mg/dL Final     Triglycerides   Date Value Ref Range Status   12/23/2020 324 (H) <=149 mg/dL Final     HDL Cholesterol   Date Value Ref Range Status   12/23/2020 38 (L) >=40 mg/dL Final     LDL Calculated   Date Value Ref Range Status   12/23/2020 95 <=129 mg/dL Final     Patient Fasting > 8hrs?   Date Value Ref Range Status   12/23/2020 Yes  Final             Passed - PCP or prescribing provider visit in past 12 months       Last office visit with prescriber/PCP: 9/25/2019 Guerrero Mercado MD OR same dept: Visit date not found OR same specialty: 9/25/2019 Guerrero Mercado MD  Last physical: 12/23/2020 Last MTM visit: Visit date not found   Next visit within 3 mo: Visit date not found  Next physical within 3 mo: Visit date not found  Prescriber OR PCP: Guerrero Mercado MD  Last diagnosis associated with med order: 1. Mixed hyperlipidemia  - ezetimibe (ZETIA) 10 mg tablet; Take 1 tablet by mouth once daily  Dispense: 90 tablet; Refill: 1    If protocol passes may refill for 12 months if within 3 months of last provider visit (or a total of 15 months).

## 2021-10-16 ENCOUNTER — HEALTH MAINTENANCE LETTER (OUTPATIENT)
Age: 56
End: 2021-10-16

## 2021-12-14 DIAGNOSIS — E78.2 MIXED HYPERLIPIDEMIA: Primary | ICD-10-CM

## 2021-12-15 RX ORDER — EZETIMIBE 10 MG/1
TABLET ORAL
Qty: 90 TABLET | Refills: 0 | Status: SHIPPED | OUTPATIENT
Start: 2021-12-15 | End: 2022-02-23

## 2022-02-05 ENCOUNTER — HEALTH MAINTENANCE LETTER (OUTPATIENT)
Age: 57
End: 2022-02-05

## 2022-02-22 DIAGNOSIS — E78.2 MIXED HYPERLIPIDEMIA: ICD-10-CM

## 2022-02-22 DIAGNOSIS — I10 ESSENTIAL HYPERTENSION, BENIGN: ICD-10-CM

## 2022-02-23 RX ORDER — AMLODIPINE BESYLATE 5 MG/1
TABLET ORAL
Qty: 30 TABLET | Refills: 0 | Status: SHIPPED | OUTPATIENT
Start: 2022-02-23 | End: 2022-03-21

## 2022-02-23 RX ORDER — EZETIMIBE 10 MG/1
TABLET ORAL
Qty: 90 TABLET | Refills: 0 | Status: SHIPPED | OUTPATIENT
Start: 2022-02-23 | End: 2022-06-22

## 2022-02-23 RX ORDER — LISINOPRIL 40 MG/1
TABLET ORAL
Qty: 30 TABLET | Refills: 0 | Status: SHIPPED | OUTPATIENT
Start: 2022-02-23 | End: 2022-03-21

## 2022-02-23 NOTE — TELEPHONE ENCOUNTER
"Routing refill request to provider for review/approval because:  Labs not current  Patient needs to be seen because it has been more than 1 year since last office visit.    Last Written Prescription Date:  1/27/2021  Last Fill Quantity: 90,  # refills: 3   Last office visit provider:  12/23/2020     Requested Prescriptions   Pending Prescriptions Disp Refills     amLODIPine (NORVASC) 5 MG tablet [Pharmacy Med Name: amLODIPine Besylate 5 MG Oral Tablet] 30 tablet 0     Sig: Take 1 tablet by mouth once daily       Calcium Channel Blockers Protocol  Failed - 2/22/2022  7:44 PM        Failed - Blood pressure under 140/90 in past 12 months     BP Readings from Last 3 Encounters:   12/23/20 132/79   10/09/19 131/85   09/25/19 (!) 156/95                 Failed - Recent (12 mo) or future (30 days) visit within the authorizing provider's specialty     Patient has had an office visit with the authorizing provider or a provider within the authorizing providers department within the previous 12 mos or has a future within next 30 days. See \"Patient Info\" tab in inbasket, or \"Choose Columns\" in Meds & Orders section of the refill encounter.              Failed - Normal serum creatinine on file in past 12 months     Recent Labs   Lab Test 12/23/20  0834   CR 1.06       Ok to refill medication if creatinine is low          Passed - Medication is active on med list        Passed - Patient is age 18 or older        Routing refill request to provider for review/approval because:  Labs not current  Patient needs to be seen because it has been more than 1 year since last office visit.    Last Written Prescription Date:  1/27/2021  Last Fill Quantity: 90,  # refills: 3   Last office visit provider:  12/23/2020            lisinopril (ZESTRIL) 40 MG tablet [Pharmacy Med Name: Lisinopril 40 MG Oral Tablet] 30 tablet 0     Sig: Take 1 tablet by mouth once daily       ACE Inhibitors (Including Combos) Protocol Failed - 2/22/2022  7:44 PM    " "    Failed - Blood pressure under 140/90 in past 12 months     BP Readings from Last 3 Encounters:   12/23/20 132/79   10/09/19 131/85   09/25/19 (!) 156/95                 Failed - Recent (12 mo) or future (30 days) visit within the authorizing provider's specialty     Patient has had an office visit with the authorizing provider or a provider within the authorizing providers department within the previous 12 mos or has a future within next 30 days. See \"Patient Info\" tab in inbasket, or \"Choose Columns\" in Meds & Orders section of the refill encounter.              Failed - Normal serum creatinine on file in past 12 months     Recent Labs   Lab Test 12/23/20  0834   CR 1.06       Ok to refill medication if creatinine is low          Failed - Normal serum potassium on file in past 12 months     Recent Labs   Lab Test 12/23/20  0834   POTASSIUM 4.6             Passed - Medication is active on med list        Passed - Patient is age 18 or older        Routing refill request to provider for review/approval because:  Labs not current  Patient needs to be seen because it has been more than 1 year since last office visit.    Last Written Prescription Date:  12/15/2021  Last Fill Quantity: 90,  # refills: 0   Last office visit provider:  12/23/2020            ezetimibe (ZETIA) 10 MG tablet [Pharmacy Med Name: Ezetimibe 10 MG Oral Tablet] 90 tablet 0     Sig: Take 1 tablet by mouth once daily       Antihyperlipidemic agents Failed - 2/22/2022  7:44 PM        Failed - Lipid panel on file in past 12 mos     Recent Labs   Lab Test 12/23/20  0834   CHOL 198   TRIG 324*   HDL 38*   LDL 95               Failed - Normal serum ALT on record in past 12 mos     Recent Labs   Lab Test 12/23/20  0834   ALT 40             Failed - Recent (12 mo) or future (30 days) visit within the authorizing provider's specialty     Patient has had an office visit with the authorizing provider or a provider within the authorizing providers department " "within the previous 12 mos or has a future within next 30 days. See \"Patient Info\" tab in inbasket, or \"Choose Columns\" in Meds & Orders section of the refill encounter.              Passed - Medication is active on med list        Passed - Patient is age 18 years or older             Lianna Killian RN 02/23/22 11:35 AM  "

## 2022-03-14 DIAGNOSIS — J30.2 SEASONAL ALLERGIC RHINITIS, UNSPECIFIED TRIGGER: ICD-10-CM

## 2022-03-14 NOTE — TELEPHONE ENCOUNTER
"Routing refill request to provider for review/approval because:  A break in medication  Patient needs to be seen because it has been more than 1 year since last office visit.    Last Written Prescription Date:  6/7/21  Last Fill Quantity: 3303,  # refills: 0   Last office visit provider:  12/23/20     Requested Prescriptions   Pending Prescriptions Disp Refills     montelukast (SINGULAIR) 10 MG tablet [Pharmacy Med Name: Montelukast Sodium 10 MG Oral Tablet] 30 tablet 0     Sig: Take 1 tablet by mouth once daily       Leukotriene Inhibitors Protocol Failed - 3/14/2022 11:44 AM        Failed - Recent (12 mo) or future (30 days) visit within the authorizing provider's specialty     Patient has had an office visit with the authorizing provider or a provider within the authorizing providers department within the previous 12 mos or has a future within next 30 days. See \"Patient Info\" tab in inbasket, or \"Choose Columns\" in Meds & Orders section of the refill encounter.              Passed - Patient is age 12 or older     If patient is under 16, ok to refill using age based dosing.           Passed - Medication is active on med list             Maik Villa RN 03/14/22 11:44 AM  "

## 2022-03-15 RX ORDER — MONTELUKAST SODIUM 10 MG/1
TABLET ORAL
Qty: 30 TABLET | Refills: 0 | Status: SHIPPED | OUTPATIENT
Start: 2022-03-15 | End: 2022-04-18

## 2022-03-19 DIAGNOSIS — I10 ESSENTIAL HYPERTENSION, BENIGN: ICD-10-CM

## 2022-03-19 DIAGNOSIS — N52.9 ED (ERECTILE DYSFUNCTION): ICD-10-CM

## 2022-03-21 RX ORDER — AMLODIPINE BESYLATE 5 MG/1
TABLET ORAL
Qty: 30 TABLET | Refills: 0 | Status: SHIPPED | OUTPATIENT
Start: 2022-03-21 | End: 2022-04-18

## 2022-03-21 RX ORDER — SILDENAFIL CITRATE 20 MG/1
TABLET ORAL
Qty: 90 TABLET | Refills: 0 | Status: SHIPPED | OUTPATIENT
Start: 2022-03-21 | End: 2022-06-22

## 2022-03-21 RX ORDER — LISINOPRIL 40 MG/1
TABLET ORAL
Qty: 30 TABLET | Refills: 0 | Status: SHIPPED | OUTPATIENT
Start: 2022-03-21 | End: 2022-04-18

## 2022-03-21 NOTE — TELEPHONE ENCOUNTER
"Routing refill request to provider for review/approval because:  Labs not current:  Creatinine potassium   Patient needs to be seen because it has been more than 1 year since last office visit.  Blood pressure.     Last Written Prescription Date:  2/23/2022  Last Fill Quantity: 30,  # refills: 0   Last office visit provider:  12/23/2020     Requested Prescriptions   Pending Prescriptions Disp Refills     lisinopril (ZESTRIL) 40 MG tablet [Pharmacy Med Name: Lisinopril 40 MG Oral Tablet] 30 tablet 0     Sig: Take 1 tablet by mouth once daily       ACE Inhibitors (Including Combos) Protocol Failed - 3/21/2022 12:37 PM        Failed - Blood pressure under 140/90 in past 12 months     BP Readings from Last 3 Encounters:   12/23/20 132/79   10/09/19 131/85   09/25/19 (!) 156/95                 Failed - Recent (12 mo) or future (30 days) visit within the authorizing provider's specialty     Patient has had an office visit with the authorizing provider or a provider within the authorizing providers department within the previous 12 mos or has a future within next 30 days. See \"Patient Info\" tab in inbasket, or \"Choose Columns\" in Meds & Orders section of the refill encounter.              Failed - Normal serum creatinine on file in past 12 months     Recent Labs   Lab Test 12/23/20  0834   CR 1.06       Ok to refill medication if creatinine is low          Failed - Normal serum potassium on file in past 12 months     Recent Labs   Lab Test 12/23/20  0834   POTASSIUM 4.6             Passed - Medication is active on med list        Passed - Patient is age 18 or older      Last Written Prescription Date:  2/23/2022  Last Fill Quantity: 30,  # refills: 0   Last office visit provider:  12/23/2020        amLODIPine (NORVASC) 5 MG tablet [Pharmacy Med Name: amLODIPine Besylate 5 MG Oral Tablet] 30 tablet 0     Sig: Take 1 tablet by mouth once daily       Calcium Channel Blockers Protocol  Failed - 3/21/2022 12:37 PM        Failed " "- Blood pressure under 140/90 in past 12 months     BP Readings from Last 3 Encounters:   12/23/20 132/79   10/09/19 131/85   09/25/19 (!) 156/95                 Failed - Recent (12 mo) or future (30 days) visit within the authorizing provider's specialty     Patient has had an office visit with the authorizing provider or a provider within the authorizing providers department within the previous 12 mos or has a future within next 30 days. See \"Patient Info\" tab in inbasket, or \"Choose Columns\" in Meds & Orders section of the refill encounter.              Failed - Normal serum creatinine on file in past 12 months     Recent Labs   Lab Test 12/23/20  0834   CR 1.06       Ok to refill medication if creatinine is low          Passed - Medication is active on med list        Passed - Patient is age 18 or older        Last Written Prescription Date:  3/17/2021  Last Fill Quantity: 90,  # refills: 3   Last office visit provider:  12/23/2020        sildenafil (REVATIO) 20 MG tablet [Pharmacy Med Name: Sildenafil Citrate 20 MG Oral Tablet] 90 tablet 0     Sig: TAKE 1 TABLET BY MOUTH ONCE DAILY AS NEEDED       Erectile Dysfuction Protocol Failed - 3/21/2022 12:37 PM        Failed - Recent (12 mo) or future (30 days) visit within the authorizing provider's specialty     Patient has had an office visit with the authorizing provider or a provider within the authorizing providers department within the previous 12 mos or has a future within next 30 days. See \"Patient Info\" tab in inbasket, or \"Choose Columns\" in Meds & Orders section of the refill encounter.              Passed - Absence of nitrates on medication list        Passed - Absence of Alpha Blockers on Med list        Passed - Medication is active on med list        Passed - Patient is age 18 or older             Maeve Rivera RN 03/21/22 12:38 PM  "

## 2022-04-16 DIAGNOSIS — I10 ESSENTIAL HYPERTENSION, BENIGN: ICD-10-CM

## 2022-04-16 DIAGNOSIS — J30.2 SEASONAL ALLERGIC RHINITIS, UNSPECIFIED TRIGGER: ICD-10-CM

## 2022-04-18 RX ORDER — MONTELUKAST SODIUM 10 MG/1
TABLET ORAL
Qty: 30 TABLET | Refills: 0 | Status: SHIPPED | OUTPATIENT
Start: 2022-04-18 | End: 2022-05-17

## 2022-04-18 RX ORDER — LISINOPRIL 40 MG/1
TABLET ORAL
Qty: 30 TABLET | Refills: 0 | Status: SHIPPED | OUTPATIENT
Start: 2022-04-18 | End: 2022-05-17

## 2022-04-18 RX ORDER — AMLODIPINE BESYLATE 5 MG/1
TABLET ORAL
Qty: 30 TABLET | Refills: 0 | Status: SHIPPED | OUTPATIENT
Start: 2022-04-18 | End: 2022-05-17

## 2022-04-18 NOTE — TELEPHONE ENCOUNTER
"Routing refill request to provider for review/approval because:  Labs not current:  Potassium, creatining  Patient needs to be seen because it has been more than 1 year since last office visit.  Blood pressure not currrent    Last Written Prescription Date:  3/15/2022  Last Fill Quantity: 30,  # refills: 0   Last office visit provider:  12/23/2020 Dr. Mercado     Requested Prescriptions   Pending Prescriptions Disp Refills     montelukast (SINGULAIR) 10 MG tablet [Pharmacy Med Name: Montelukast Sodium 10 MG Oral Tablet] 30 tablet 0     Sig: Take 1 tablet by mouth once daily       Leukotriene Inhibitors Protocol Failed - 4/16/2022  6:42 PM        Failed - Recent (12 mo) or future (30 days) visit within the authorizing provider's specialty     Patient has had an office visit with the authorizing provider or a provider within the authorizing providers department within the previous 12 mos or has a future within next 30 days. See \"Patient Info\" tab in inbasket, or \"Choose Columns\" in Meds & Orders section of the refill encounter.              Passed - Patient is age 12 or older     If patient is under 16, ok to refill using age based dosing.           Passed - Medication is active on med list           lisinopril (ZESTRIL) 40 MG tablet [Pharmacy Med Name: Lisinopril 40 MG Oral Tablet] 30 tablet 0     Sig: Take 1 tablet by mouth once daily       ACE Inhibitors (Including Combos) Protocol Failed - 4/16/2022  6:42 PM        Failed - Blood pressure under 140/90 in past 12 months     BP Readings from Last 3 Encounters:   12/23/20 132/79   10/09/19 131/85   09/25/19 (!) 156/95                 Failed - Recent (12 mo) or future (30 days) visit within the authorizing provider's specialty     Patient has had an office visit with the authorizing provider or a provider within the authorizing providers department within the previous 12 mos or has a future within next 30 days. See \"Patient Info\" tab in inbasket, or \"Choose Columns\" " "in Meds & Orders section of the refill encounter.              Failed - Normal serum creatinine on file in past 12 months     Recent Labs   Lab Test 12/23/20  0834   CR 1.06       Ok to refill medication if creatinine is low          Failed - Normal serum potassium on file in past 12 months     Recent Labs   Lab Test 12/23/20  0834   POTASSIUM 4.6             Passed - Medication is active on med list        Passed - Patient is age 18 or older           amLODIPine (NORVASC) 5 MG tablet [Pharmacy Med Name: amLODIPine Besylate 5 MG Oral Tablet] 30 tablet 0     Sig: Take 1 tablet by mouth once daily       Calcium Channel Blockers Protocol  Failed - 4/16/2022  6:42 PM        Failed - Blood pressure under 140/90 in past 12 months     BP Readings from Last 3 Encounters:   12/23/20 132/79   10/09/19 131/85   09/25/19 (!) 156/95                 Failed - Recent (12 mo) or future (30 days) visit within the authorizing provider's specialty     Patient has had an office visit with the authorizing provider or a provider within the authorizing providers department within the previous 12 mos or has a future within next 30 days. See \"Patient Info\" tab in inbasket, or \"Choose Columns\" in Meds & Orders section of the refill encounter.              Failed - Normal serum creatinine on file in past 12 months     Recent Labs   Lab Test 12/23/20  0834   CR 1.06       Ok to refill medication if creatinine is low          Passed - Medication is active on med list        Passed - Patient is age 18 or older             Carina Gomez RN 04/18/22 3:34 PM  "

## 2022-05-16 DIAGNOSIS — I10 ESSENTIAL HYPERTENSION, BENIGN: ICD-10-CM

## 2022-05-16 DIAGNOSIS — J30.2 SEASONAL ALLERGIC RHINITIS, UNSPECIFIED TRIGGER: ICD-10-CM

## 2022-05-17 RX ORDER — AMLODIPINE BESYLATE 5 MG/1
TABLET ORAL
Qty: 30 TABLET | Refills: 0 | Status: SHIPPED | OUTPATIENT
Start: 2022-05-17 | End: 2022-06-22

## 2022-05-17 RX ORDER — MONTELUKAST SODIUM 10 MG/1
TABLET ORAL
Qty: 30 TABLET | Refills: 0 | Status: SHIPPED | OUTPATIENT
Start: 2022-05-17 | End: 2022-06-22

## 2022-05-17 RX ORDER — LISINOPRIL 40 MG/1
TABLET ORAL
Qty: 30 TABLET | Refills: 0 | Status: SHIPPED | OUTPATIENT
Start: 2022-05-17 | End: 2022-06-22

## 2022-05-17 NOTE — TELEPHONE ENCOUNTER
"Routing refill request to provider for review/approval because:  Labs not current:  Multiple  Patient needs to be seen because it has been more than 1 year since last office visit.  BP not current    Last Written Prescription Date:  4/18/22  Last Fill Quantity: 30,  # refills: 0   Last office visit provider:  12/23/20     Requested Prescriptions   Pending Prescriptions Disp Refills     montelukast (SINGULAIR) 10 MG tablet [Pharmacy Med Name: Montelukast Sodium 10 MG Oral Tablet] 30 tablet 0     Sig: Take 1 tablet by mouth once daily       Leukotriene Inhibitors Protocol Failed - 5/16/2022  5:34 AM        Failed - Recent (12 mo) or future (30 days) visit within the authorizing provider's specialty     Patient has had an office visit with the authorizing provider or a provider within the authorizing providers department within the previous 12 mos or has a future within next 30 days. See \"Patient Info\" tab in inbasket, or \"Choose Columns\" in Meds & Orders section of the refill encounter.              Passed - Patient is age 12 or older     If patient is under 16, ok to refill using age based dosing.           Passed - Medication is active on med list           lisinopril (ZESTRIL) 40 MG tablet [Pharmacy Med Name: Lisinopril 40 MG Oral Tablet] 30 tablet 0     Sig: Take 1 tablet by mouth once daily       ACE Inhibitors (Including Combos) Protocol Failed - 5/16/2022  5:34 AM        Failed - Blood pressure under 140/90 in past 12 months     BP Readings from Last 3 Encounters:   12/23/20 132/79   10/09/19 131/85   09/25/19 (!) 156/95                 Failed - Recent (12 mo) or future (30 days) visit within the authorizing provider's specialty     Patient has had an office visit with the authorizing provider or a provider within the authorizing providers department within the previous 12 mos or has a future within next 30 days. See \"Patient Info\" tab in inbasket, or \"Choose Columns\" in Meds & Orders section of the refill " "encounter.              Failed - Normal serum creatinine on file in past 12 months     Recent Labs   Lab Test 12/23/20  0834   CR 1.06       Ok to refill medication if creatinine is low          Failed - Normal serum potassium on file in past 12 months     Recent Labs   Lab Test 12/23/20  0834   POTASSIUM 4.6             Passed - Medication is active on med list        Passed - Patient is age 18 or older           amLODIPine (NORVASC) 5 MG tablet [Pharmacy Med Name: amLODIPine Besylate 5 MG Oral Tablet] 30 tablet 0     Sig: Take 1 tablet by mouth once daily       Calcium Channel Blockers Protocol  Failed - 5/16/2022  5:34 AM        Failed - Blood pressure under 140/90 in past 12 months     BP Readings from Last 3 Encounters:   12/23/20 132/79   10/09/19 131/85   09/25/19 (!) 156/95                 Failed - Recent (12 mo) or future (30 days) visit within the authorizing provider's specialty     Patient has had an office visit with the authorizing provider or a provider within the authorizing providers department within the previous 12 mos or has a future within next 30 days. See \"Patient Info\" tab in inbasket, or \"Choose Columns\" in Meds & Orders section of the refill encounter.              Failed - Normal serum creatinine on file in past 12 months     Recent Labs   Lab Test 12/23/20  0834   CR 1.06       Ok to refill medication if creatinine is low          Passed - Medication is active on med list        Passed - Patient is age 18 or older             Maik Villa RN 05/17/22 7:22 AM  "

## 2022-06-22 ENCOUNTER — OFFICE VISIT (OUTPATIENT)
Dept: FAMILY MEDICINE | Facility: CLINIC | Age: 57
End: 2022-06-22
Payer: COMMERCIAL

## 2022-06-22 VITALS
BODY MASS INDEX: 31.7 KG/M2 | DIASTOLIC BLOOD PRESSURE: 91 MMHG | WEIGHT: 214 LBS | HEIGHT: 69 IN | RESPIRATION RATE: 16 BRPM | HEART RATE: 74 BPM | SYSTOLIC BLOOD PRESSURE: 141 MMHG

## 2022-06-22 DIAGNOSIS — Z12.5 SCREENING FOR PROSTATE CANCER: ICD-10-CM

## 2022-06-22 DIAGNOSIS — J30.2 SEASONAL ALLERGIC RHINITIS, UNSPECIFIED TRIGGER: ICD-10-CM

## 2022-06-22 DIAGNOSIS — I10 ESSENTIAL HYPERTENSION, BENIGN: ICD-10-CM

## 2022-06-22 DIAGNOSIS — E78.2 MIXED HYPERLIPIDEMIA: ICD-10-CM

## 2022-06-22 DIAGNOSIS — N52.9 ERECTILE DYSFUNCTION, UNSPECIFIED ERECTILE DYSFUNCTION TYPE: ICD-10-CM

## 2022-06-22 DIAGNOSIS — Z00.00 HEALTH CARE MAINTENANCE: Primary | ICD-10-CM

## 2022-06-22 LAB
ALBUMIN SERPL-MCNC: 4.4 G/DL (ref 3.5–5)
ALP SERPL-CCNC: 82 U/L (ref 45–120)
ALT SERPL W P-5'-P-CCNC: 45 U/L (ref 0–45)
ANION GAP SERPL CALCULATED.3IONS-SCNC: 11 MMOL/L (ref 5–18)
AST SERPL W P-5'-P-CCNC: 30 U/L (ref 0–40)
BILIRUB SERPL-MCNC: 0.8 MG/DL (ref 0–1)
BUN SERPL-MCNC: 14 MG/DL (ref 8–22)
CALCIUM SERPL-MCNC: 10.1 MG/DL (ref 8.5–10.5)
CHLORIDE BLD-SCNC: 104 MMOL/L (ref 98–107)
CHOLEST SERPL-MCNC: 179 MG/DL
CO2 SERPL-SCNC: 25 MMOL/L (ref 22–31)
CREAT SERPL-MCNC: 1.01 MG/DL (ref 0.7–1.3)
ERYTHROCYTE [DISTWIDTH] IN BLOOD BY AUTOMATED COUNT: 12.2 % (ref 10–15)
FASTING STATUS PATIENT QL REPORTED: YES
GFR SERPL CREATININE-BSD FRML MDRD: 88 ML/MIN/1.73M2
GLUCOSE BLD-MCNC: 117 MG/DL (ref 70–125)
HCT VFR BLD AUTO: 44.6 % (ref 40–53)
HDLC SERPL-MCNC: 41 MG/DL
HGB BLD-MCNC: 15.9 G/DL (ref 13.3–17.7)
LDLC SERPL CALC-MCNC: 88 MG/DL
MCH RBC QN AUTO: 31.9 PG (ref 26.5–33)
MCHC RBC AUTO-ENTMCNC: 35.7 G/DL (ref 31.5–36.5)
MCV RBC AUTO: 90 FL (ref 78–100)
PLATELET # BLD AUTO: 281 10E3/UL (ref 150–450)
POTASSIUM BLD-SCNC: 4.6 MMOL/L (ref 3.5–5)
PROT SERPL-MCNC: 7.4 G/DL (ref 6–8)
PSA SERPL-MCNC: 2.91 UG/L (ref 0–3.5)
RBC # BLD AUTO: 4.98 10E6/UL (ref 4.4–5.9)
SODIUM SERPL-SCNC: 140 MMOL/L (ref 136–145)
TRIGL SERPL-MCNC: 249 MG/DL
WBC # BLD AUTO: 4.7 10E3/UL (ref 4–11)

## 2022-06-22 PROCEDURE — 85027 COMPLETE CBC AUTOMATED: CPT | Performed by: FAMILY MEDICINE

## 2022-06-22 PROCEDURE — 80061 LIPID PANEL: CPT | Performed by: FAMILY MEDICINE

## 2022-06-22 PROCEDURE — 99214 OFFICE O/P EST MOD 30 MIN: CPT | Mod: 25 | Performed by: FAMILY MEDICINE

## 2022-06-22 PROCEDURE — G0103 PSA SCREENING: HCPCS | Performed by: FAMILY MEDICINE

## 2022-06-22 PROCEDURE — 36415 COLL VENOUS BLD VENIPUNCTURE: CPT | Performed by: FAMILY MEDICINE

## 2022-06-22 PROCEDURE — 80053 COMPREHEN METABOLIC PANEL: CPT | Performed by: FAMILY MEDICINE

## 2022-06-22 PROCEDURE — 99396 PREV VISIT EST AGE 40-64: CPT | Performed by: FAMILY MEDICINE

## 2022-06-22 RX ORDER — EZETIMIBE 10 MG/1
10 TABLET ORAL DAILY
Qty: 90 TABLET | Refills: 3 | Status: SHIPPED | OUTPATIENT
Start: 2022-06-22 | End: 2023-06-26

## 2022-06-22 RX ORDER — AMLODIPINE BESYLATE 5 MG/1
5 TABLET ORAL DAILY
Qty: 90 TABLET | Refills: 3 | Status: SHIPPED | OUTPATIENT
Start: 2022-06-22 | End: 2023-06-26

## 2022-06-22 RX ORDER — SILDENAFIL CITRATE 20 MG/1
TABLET ORAL
Qty: 90 TABLET | Refills: 0 | Status: SHIPPED | OUTPATIENT
Start: 2022-06-22 | End: 2022-10-11

## 2022-06-22 RX ORDER — LISINOPRIL 40 MG/1
40 TABLET ORAL DAILY
Qty: 90 TABLET | Refills: 3 | Status: SHIPPED | OUTPATIENT
Start: 2022-06-22 | End: 2023-06-26

## 2022-06-22 RX ORDER — MONTELUKAST SODIUM 10 MG/1
1 TABLET ORAL DAILY
Qty: 90 TABLET | Refills: 3 | Status: SHIPPED | OUTPATIENT
Start: 2022-06-22 | End: 2023-06-26

## 2022-06-22 ASSESSMENT — ENCOUNTER SYMPTOMS
HEADACHES: 0
HEMATOCHEZIA: 0
NERVOUS/ANXIOUS: 0
FEVER: 0
JOINT SWELLING: 0
SORE THROAT: 0
DYSURIA: 0
PALPITATIONS: 0
WEAKNESS: 0
EYE PAIN: 0
PARESTHESIAS: 0
COUGH: 0
MYALGIAS: 0
DIARRHEA: 0
HEARTBURN: 0
HEMATURIA: 0
ABDOMINAL PAIN: 0
CONSTIPATION: 0
SHORTNESS OF BREATH: 0
DIZZINESS: 0
ARTHRALGIAS: 0
NAUSEA: 0
FREQUENCY: 0
CHILLS: 0

## 2022-06-22 NOTE — PROGRESS NOTES
SUBJECTIVE:   CC: Matias Rosas is an 56 year old male who presents for preventative health visit.   Patient is here for annual exam.  Patient has a history of hyperlipidemia hypertension and an ED.  He has been taking medications without complication.  Requesting refills of all his medications today.  He has been on Singulair for allergies which have been working great for him.  He like to continue with all current medications.  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  Discussed COVID pandemic  Patient has no other acute concerns today    Patient has been advised of split billing requirements and indicates understanding: Yes  Healthy Habits:     Getting at least 3 servings of Calcium per day:  Yes    Bi-annual eye exam:  NO    Dental care twice a year:  Yes    Sleep apnea or symptoms of sleep apnea:  None    Diet:  Regular (no restrictions)    Frequency of exercise:  2-3 days/week    Duration of exercise:  15-30 minutes    Taking medications regularly:  Yes    Medication side effects:  None    PHQ-2 Total Score: 0    Additional concerns today:  No      Today's PHQ-2 Score:   PHQ-2 ( 1999 Pfizer) 6/22/2022   Q1: Little interest or pleasure in doing things 0   Q2: Feeling down, depressed or hopeless 0   PHQ-2 Score 0   Q1: Little interest or pleasure in doing things Not at all   Q2: Feeling down, depressed or hopeless Not at all   PHQ-2 Score 0       Abuse: Current or Past(Physical, Sexual or Emotional)- No  Do you feel safe in your environment? Yes    Have you ever done Advance Care Planning? (For example, a Health Directive, POLST, or a discussion with a medical provider or your loved ones about your wishes): No, advance care planning information given to patient to review.  Advanced care planning was discussed at today's visit.    Social History     Tobacco Use     Smoking status: Current Some Day Smoker     Types: Cigars, Cigars     Smokeless  tobacco: Former User   Substance Use Topics     Alcohol use: Yes     Comment: Alcoholic Drinks/day: Social          Alcohol Use 6/22/2022   Prescreen: >3 drinks/day or >7 drinks/week? No       Last PSA:   Prostate Specific Antigen Screen   Date Value Ref Range Status   12/23/2020 2.4 0.0 - 3.5 ng/mL Final       Reviewed orders with patient. Reviewed health maintenance and updated orders accordingly - Yes  Lab work is in process    Reviewed and updated as needed this visit by clinical staff   Tobacco  Allergies  Meds                Reviewed and updated as needed this visit by Provider                       Review of Systems   Constitutional: Negative for chills and fever.   HENT: Negative for congestion, ear pain, hearing loss and sore throat.    Eyes: Negative for pain and visual disturbance.   Respiratory: Negative for cough and shortness of breath.    Cardiovascular: Negative for chest pain, palpitations and peripheral edema.   Gastrointestinal: Negative for abdominal pain, constipation, diarrhea, heartburn, hematochezia and nausea.   Genitourinary: Negative for dysuria, frequency, genital sores, hematuria, impotence, penile discharge and urgency.   Musculoskeletal: Negative for arthralgias, joint swelling and myalgias.   Skin: Negative for rash.   Neurological: Negative for dizziness, weakness, headaches and paresthesias.   Psychiatric/Behavioral: Negative for mood changes. The patient is not nervous/anxious.      CONSTITUTIONAL: NEGATIVE for fever, chills, change in weight  INTEGUMENTARY/SKIN: NEGATIVE for worrisome rashes, moles or lesions  EYES: NEGATIVE for vision changes or irritation  ENT: NEGATIVE for ear, mouth and throat problems  RESP: NEGATIVE for significant cough or SOB  CV: NEGATIVE for chest pain, palpitations or peripheral edema  GI: NEGATIVE for nausea, abdominal pain, heartburn, or change in bowel habits   male: negative for dysuria, hematuria, decreased urinary stream, erectile  "dysfunction, urethral discharge  MUSCULOSKELETAL: NEGATIVE for significant arthralgias or myalgia  NEURO: NEGATIVE for weakness, dizziness or paresthesias  PSYCHIATRIC: NEGATIVE for changes in mood or affect    OBJECTIVE:   BP (!) 141/91 (BP Location: Left arm, Patient Position: Sitting, Cuff Size: Adult Large)   Pulse 74   Resp 16   Ht 1.765 m (5' 9.49\")   Wt 97.1 kg (214 lb)   BMI 31.16 kg/m      Physical Exam  GENERAL: healthy, alert and no distress  EYES: Eyes grossly normal to inspection, PERRL and conjunctivae and sclerae normal  HENT: ear canals and TM's normal, nose and mouth without ulcers or lesions  RESP: lungs clear to auscultation - no rales, rhonchi or wheezes  CV: regular rate and rhythm, normal S1 S2, no S3 or S4, no murmur, click or rub, no peripheral edema and peripheral pulses strong  ABDOMEN: bowel sounds normal  MS: no gross musculoskeletal defects noted, no edema  NEURO: Normal strength and tone, mentation intact and speech normal  PSYCH: mentation appears normal, affect normal/bright    Diagnostic Test Results:  Labs reviewed in Epic    ASSESSMENT/PLAN:   Matias was seen today for physical.    Diagnoses and all orders for this visit:    Health care maintenance  -     CBC with platelets; Future  -     Comprehensive metabolic panel; Future  -     Lipid panel reflex to direct LDL Fasting; Future  -     Prostate Specific Antigen Screen; Future  -     CBC with platelets  -     Comprehensive metabolic panel  -     Lipid panel reflex to direct LDL Fasting  -     Prostate Specific Antigen Screen  Patient is fasting able obtain blood work and follow-up based on results  Mixed hyperlipidemia  -     ezetimibe (ZETIA) 10 MG tablet; Take 1 tablet (10 mg) by mouth daily  Check lipid levels today  Essential hypertension, benign  -     lisinopril (ZESTRIL) 40 MG tablet; Take 1 tablet (40 mg) by mouth daily  -     amLODIPine (NORVASC) 5 MG tablet; Take 1 tablet (5 mg) by mouth daily  Blood pressure " "slightly elevated none patient feels he has whitecoat hypertension  He will return in 2 weeks for nurse blood pressure check if it continues to run high consider amlodipine change to 10 mg  Seasonal allergic rhinitis, unspecified trigger  -     montelukast (SINGULAIR) 10 MG tablet; Take 1 tablet (10 mg) by mouth daily  Patient feels Singulair working well continue with current dosing  Screening for prostate cancer  -     Prostate Specific Antigen Screen; Future  -     Prostate Specific Antigen Screen  Screen for prostate cancer with PSA  Erectile dysfunction, unspecified erectile dysfunction type  -     sildenafil (REVATIO) 20 MG tablet; TAKE 1 TABLET BY MOUTH ONCE DAILY AS NEEDED  Patient requesting refills for Revatio  Other orders  -     PRIMARY CARE FOLLOW-UP SCHEDULING; Future        Patient has been advised of split billing requirements and indicates understanding: Yes    COUNSELING:   Reviewed preventive health counseling, as reflected in patient instructions       Regular exercise       Healthy diet/nutrition    Estimated body mass index is 31.16 kg/m  as calculated from the following:    Height as of this encounter: 1.765 m (5' 9.49\").    Weight as of this encounter: 97.1 kg (214 lb).     Weight management plan: Discussed healthy diet and exercise guidelines    He reports that he has been smoking cigars and cigars. He has quit using smokeless tobacco.  Tobacco Cessation Action Plan:   Self help information given to patient      Counseling Resources:  ATP IV Guidelines  Pooled Cohorts Equation Calculator  FRAX Risk Assessment  ICSI Preventive Guidelines  Dietary Guidelines for Americans, 2010  USDA's MyPlate  ASA Prophylaxis  Lung CA Screening    Guerrero Mercado MD  [facility]  "

## 2022-09-25 ENCOUNTER — HEALTH MAINTENANCE LETTER (OUTPATIENT)
Age: 57
End: 2022-09-25

## 2022-10-11 DIAGNOSIS — N52.9 ERECTILE DYSFUNCTION, UNSPECIFIED ERECTILE DYSFUNCTION TYPE: ICD-10-CM

## 2022-10-11 RX ORDER — SILDENAFIL CITRATE 20 MG/1
TABLET ORAL
Qty: 90 TABLET | Refills: 2 | Status: SHIPPED | OUTPATIENT
Start: 2022-10-11 | End: 2023-06-26

## 2022-10-11 NOTE — TELEPHONE ENCOUNTER
"Last Written Prescription Date:  6/22/22  Last Fill Quantity: 90,  # refills: 0   Last office visit provider:  6/22/22     Requested Prescriptions   Pending Prescriptions Disp Refills     sildenafil (REVATIO) 20 MG tablet [Pharmacy Med Name: Sildenafil Citrate 20 MG Oral Tablet] 90 tablet 0     Sig: TAKE 1 TABLET BY MOUTH ONCE DAILY AS NEEDED       Erectile Dysfuction Protocol Passed - 10/11/2022  9:38 AM        Passed - Absence of nitrates on medication list        Passed - Absence of Alpha Blockers on Med list        Passed - Recent (12 mo) or future (30 days) visit within the authorizing provider's specialty     Patient has had an office visit with the authorizing provider or a provider within the authorizing providers department within the previous 12 mos or has a future within next 30 days. See \"Patient Info\" tab in inbasket, or \"Choose Columns\" in Meds & Orders section of the refill encounter.              Passed - Medication is active on med list        Passed - Patient is age 18 or older             Maik Villa RN 10/11/22 9:38 AM  "

## 2023-05-23 ENCOUNTER — PATIENT OUTREACH (OUTPATIENT)
Dept: CARE COORDINATION | Facility: CLINIC | Age: 58
End: 2023-05-23
Payer: COMMERCIAL

## 2023-08-06 ENCOUNTER — HEALTH MAINTENANCE LETTER (OUTPATIENT)
Age: 58
End: 2023-08-06

## 2023-12-19 DIAGNOSIS — E78.2 MIXED HYPERLIPIDEMIA: ICD-10-CM

## 2023-12-19 DIAGNOSIS — J30.2 SEASONAL ALLERGIC RHINITIS, UNSPECIFIED TRIGGER: ICD-10-CM

## 2023-12-19 DIAGNOSIS — I10 ESSENTIAL HYPERTENSION, BENIGN: ICD-10-CM

## 2023-12-19 DIAGNOSIS — N52.9 ERECTILE DYSFUNCTION, UNSPECIFIED ERECTILE DYSFUNCTION TYPE: ICD-10-CM

## 2023-12-19 RX ORDER — SILDENAFIL CITRATE 20 MG/1
TABLET ORAL
Qty: 90 TABLET | Refills: 0 | Status: SHIPPED | OUTPATIENT
Start: 2023-12-19 | End: 2024-03-20

## 2023-12-20 RX ORDER — LISINOPRIL 40 MG/1
TABLET ORAL
Qty: 90 TABLET | Refills: 0 | Status: SHIPPED | OUTPATIENT
Start: 2023-12-20 | End: 2024-03-20

## 2023-12-20 RX ORDER — AMLODIPINE BESYLATE 5 MG/1
TABLET ORAL
Qty: 90 TABLET | Refills: 0 | Status: SHIPPED | OUTPATIENT
Start: 2023-12-20 | End: 2024-03-20

## 2023-12-20 RX ORDER — EZETIMIBE 10 MG/1
TABLET ORAL
Qty: 90 TABLET | Refills: 0 | Status: SHIPPED | OUTPATIENT
Start: 2023-12-20 | End: 2024-03-20

## 2023-12-20 RX ORDER — MONTELUKAST SODIUM 10 MG/1
TABLET ORAL
Qty: 90 TABLET | Refills: 0 | Status: SHIPPED | OUTPATIENT
Start: 2023-12-20 | End: 2024-03-20

## 2024-03-13 SDOH — HEALTH STABILITY: PHYSICAL HEALTH: ON AVERAGE, HOW MANY MINUTES DO YOU ENGAGE IN EXERCISE AT THIS LEVEL?: 40 MIN

## 2024-03-13 SDOH — HEALTH STABILITY: PHYSICAL HEALTH: ON AVERAGE, HOW MANY DAYS PER WEEK DO YOU ENGAGE IN MODERATE TO STRENUOUS EXERCISE (LIKE A BRISK WALK)?: 4 DAYS

## 2024-03-13 ASSESSMENT — SOCIAL DETERMINANTS OF HEALTH (SDOH): HOW OFTEN DO YOU GET TOGETHER WITH FRIENDS OR RELATIVES?: ONCE A WEEK

## 2024-03-20 ENCOUNTER — OFFICE VISIT (OUTPATIENT)
Dept: FAMILY MEDICINE | Facility: CLINIC | Age: 59
End: 2024-03-20
Payer: COMMERCIAL

## 2024-03-20 VITALS
OXYGEN SATURATION: 97 % | TEMPERATURE: 97.5 F | BODY MASS INDEX: 32.14 KG/M2 | WEIGHT: 217 LBS | DIASTOLIC BLOOD PRESSURE: 92 MMHG | SYSTOLIC BLOOD PRESSURE: 159 MMHG | RESPIRATION RATE: 16 BRPM | HEART RATE: 79 BPM | HEIGHT: 69 IN

## 2024-03-20 DIAGNOSIS — I10 ESSENTIAL HYPERTENSION, BENIGN: ICD-10-CM

## 2024-03-20 DIAGNOSIS — Z00.00 HEALTH CARE MAINTENANCE: Primary | ICD-10-CM

## 2024-03-20 DIAGNOSIS — E78.2 MIXED HYPERLIPIDEMIA: ICD-10-CM

## 2024-03-20 DIAGNOSIS — J30.2 SEASONAL ALLERGIC RHINITIS, UNSPECIFIED TRIGGER: ICD-10-CM

## 2024-03-20 DIAGNOSIS — N52.9 ERECTILE DYSFUNCTION, UNSPECIFIED ERECTILE DYSFUNCTION TYPE: ICD-10-CM

## 2024-03-20 LAB
ERYTHROCYTE [DISTWIDTH] IN BLOOD BY AUTOMATED COUNT: 12.7 % (ref 10–15)
HCT VFR BLD AUTO: 46.8 % (ref 40–53)
HGB BLD-MCNC: 16.7 G/DL (ref 13.3–17.7)
MCH RBC QN AUTO: 31.9 PG (ref 26.5–33)
MCHC RBC AUTO-ENTMCNC: 35.7 G/DL (ref 31.5–36.5)
MCV RBC AUTO: 90 FL (ref 78–100)
PLATELET # BLD AUTO: 304 10E3/UL (ref 150–450)
RBC # BLD AUTO: 5.23 10E6/UL (ref 4.4–5.9)
WBC # BLD AUTO: 6.8 10E3/UL (ref 4–11)

## 2024-03-20 PROCEDURE — 99396 PREV VISIT EST AGE 40-64: CPT | Performed by: FAMILY MEDICINE

## 2024-03-20 PROCEDURE — 80061 LIPID PANEL: CPT | Performed by: FAMILY MEDICINE

## 2024-03-20 PROCEDURE — 99214 OFFICE O/P EST MOD 30 MIN: CPT | Mod: 25 | Performed by: FAMILY MEDICINE

## 2024-03-20 PROCEDURE — 80053 COMPREHEN METABOLIC PANEL: CPT | Performed by: FAMILY MEDICINE

## 2024-03-20 PROCEDURE — 84443 ASSAY THYROID STIM HORMONE: CPT | Performed by: FAMILY MEDICINE

## 2024-03-20 PROCEDURE — 85027 COMPLETE CBC AUTOMATED: CPT | Performed by: FAMILY MEDICINE

## 2024-03-20 PROCEDURE — G0103 PSA SCREENING: HCPCS | Performed by: FAMILY MEDICINE

## 2024-03-20 PROCEDURE — 36415 COLL VENOUS BLD VENIPUNCTURE: CPT | Performed by: FAMILY MEDICINE

## 2024-03-20 RX ORDER — AMLODIPINE BESYLATE 5 MG/1
5 TABLET ORAL DAILY
Qty: 90 TABLET | Refills: 3 | Status: CANCELLED | OUTPATIENT
Start: 2024-03-20

## 2024-03-20 RX ORDER — AMLODIPINE BESYLATE 10 MG/1
10 TABLET ORAL DAILY
Qty: 90 TABLET | Refills: 3 | Status: SHIPPED | OUTPATIENT
Start: 2024-03-20

## 2024-03-20 RX ORDER — MONTELUKAST SODIUM 10 MG/1
1 TABLET ORAL DAILY
Qty: 90 TABLET | Refills: 3 | Status: SHIPPED | OUTPATIENT
Start: 2024-03-20

## 2024-03-20 RX ORDER — LISINOPRIL 40 MG/1
40 TABLET ORAL DAILY
Qty: 90 TABLET | Refills: 3 | Status: SHIPPED | OUTPATIENT
Start: 2024-03-20

## 2024-03-20 RX ORDER — SILDENAFIL CITRATE 20 MG/1
TABLET ORAL
Qty: 90 TABLET | Refills: 0 | Status: SHIPPED | OUTPATIENT
Start: 2024-03-20

## 2024-03-20 RX ORDER — EZETIMIBE 10 MG/1
10 TABLET ORAL DAILY
Qty: 90 TABLET | Refills: 3 | Status: SHIPPED | OUTPATIENT
Start: 2024-03-20

## 2024-03-20 NOTE — PROGRESS NOTES
"Preventive Care Visit  [facility] [place]  Guerrero Mercado MD, Family Medicine  Mar 20, 2024      Assessment & Plan     Health care maintenance  Patient fasting today will obtain blood work and follow-up based on results  - Lipid panel reflex to direct LDL Fasting  - CBC with platelets  - Comprehensive metabolic panel  - Prostate Specific Antigen Screen  - amLODIPine (NORVASC) 10 MG tablet  Dispense: 90 tablet; Refill: 3  - TSH with free T4 reflex  - Lipid panel reflex to direct LDL Fasting  - CBC with platelets  - Comprehensive metabolic panel  - Prostate Specific Antigen Screen  - TSH with free T4 reflex    Essential hypertension, benign  Blood pressure elevated discussed increasing dosing on amlodipine  Recheck blood pressure in a couple weeks  - lisinopril (ZESTRIL) 40 MG tablet  Dispense: 90 tablet; Refill: 3    Mixed hyperlipidemia  Patient on Zetia check lipid levels  - ezetimibe (ZETIA) 10 MG tablet  Dispense: 90 tablet; Refill: 3    Seasonal allergic rhinitis, unspecified trigger  Doing well with Singulair requesting refills  - montelukast (SINGULAIR) 10 MG tablet  Dispense: 90 tablet; Refill: 3    Erectile dysfunction, unspecified erectile dysfunction type  No side effects noted refill sent to the pharmacy  - sildenafil (REVATIO) 20 MG tablet  Dispense: 90 tablet; Refill: 0      Patient has been advised of split billing requirements and indicates understanding: Yes          Nicotine/Tobacco Cessation  He reports that he has been smoking cigars. He has quit using smokeless tobacco.  Nicotine/Tobacco Cessation Plan  Self help information given to patient      BMI  Estimated body mass index is 31.78 kg/m  as calculated from the following:    Height as of this encounter: 1.76 m (5' 9.29\").    Weight as of this encounter: 98.4 kg (217 lb).   Weight management plan: Discussed healthy diet and exercise guidelines    Counseling  Appropriate preventive services were discussed with this " patient, including applicable screening as appropriate for fall prevention, nutrition, physical activity, Tobacco-use cessation, weight loss and cognition.  Checklist reviewing preventive services available has been given to the patient.  Reviewed patient's diet, addressing concerns and/or questions.           Porsche Salcedo is a 58 year old, presenting for the following:  Physical (Fasting labs )        3/20/2024    12:58 PM   Additional Questions   Roomed by Vani MEHTA CMA        Health Care Directive  Patient does not have a Health Care Directive or Living Will: Discussed advance care planning with patient; information given to patient to review.    HPI  Patient here for annual exam.  Patient is fasting interested in fasting blood work.  Patient requesting refills on medications.  Patient's blood pressure is elevated last couple times at clinic diastolically discussed increasing dosing on amlodipine.  Rechecking blood pressure in a couple weeks.  Patient on Zetia for cholesterol control we will check lipid levels today.  Leyla has been helping his seasonal allergies requesting refills.  Patient uses sildenafil for ED and requesting refills of this medication as well.  Up-to-date on immunizations.  Up-to-date on colon cancer screening.  Patient been having some difficulty with weight loss is trying to change the way he eats and exercise but not having much success we discussed diet and exercise and phone apps to try to help map caloric intake.  Will check labs such as thyroid today.            3/13/2024   General Health   How would you rate your overall physical health? Excellent   Feel stress (tense, anxious, or unable to sleep) Not at all         3/13/2024   Nutrition   Three or more servings of calcium each day? Yes   Diet: Regular (no restrictions)   How many servings of fruit and vegetables per day? (!) 2-3   How many sweetened beverages each day? 0-1         3/13/2024   Exercise   Days per week of  moderate/strenous exercise 4 days   Average minutes spent exercising at this level 40 min         3/13/2024   Social Factors   Frequency of gathering with friends or relatives Once a week   Worry food won't last until get money to buy more No   Food not last or not have enough money for food? No   Do you have housing?  Yes   Are you worried about losing your housing? No   Lack of transportation? No   Unable to get utilities (heat,electricity)? No         3/13/2024   Fall Risk   Fallen 2 or more times in the past year? No   Trouble with walking or balance? No          3/13/2024   Dental   Dentist two times every year? Yes         3/13/2024   TB Screening   Were you born outside of the [place]? No         Today's PHQ-2 Score:       3/20/2024    12:52 PM   PHQ-2 ( 1999 Pfizer)   Q1: Little interest or pleasure in doing things 0   Q2: Feeling down, depressed or hopeless 0   PHQ-2 Score 0   Q1: Little interest or pleasure in doing things Not at all   Q2: Feeling down, depressed or hopeless Not at all   PHQ-2 Score 0           3/13/2024   Substance Use   Alcohol more than 3/day or more than 7/wk No   Do you use any other substances recreationally? (!) ALCOHOL     Social History     Tobacco Use    Smoking status: Some Days     Types: Cigars    Smokeless tobacco: Former   Vaping Use    Vaping Use: Never used   Substance Use Topics    Alcohol use: Yes     Comment: Alcoholic Drinks/day: Social            3/13/2024   STI Screening   New sexual partner(s) since last STI/HIV test? No   Last PSA:   Prostate Specific Antigen Screen   Date Value Ref Range Status   06/22/2022 2.91 0.00 - 3.50 ug/L Final     ASCVD Risk   The 10-year ASCVD risk score (Jaqueline BERKOWITZ, et al., 2019) is: 20.1%    Values used to calculate the score:      Age: 58 years      Sex: Male      Is Non- : No      Diabetic: No      Tobacco smoker: Yes      Systolic Blood Pressure: 159 mmHg      Is BP treated: Yes      HDL Cholesterol: 41  "mg/dL      Total Cholesterol: 179 mg/dL           Reviewed and updated as needed this visit by Provider                             Objective    Exam  BP (!) 159/92   Pulse 79   Temp 97.5  F (36.4  C) (Oral)   Resp 16   Ht 1.76 m (5' 9.29\")   Wt 98.4 kg (217 lb)   SpO2 97%   BMI 31.78 kg/m     Estimated body mass index is 31.78 kg/m  as calculated from the following:    Height as of this encounter: 1.76 m (5' 9.29\").    Weight as of this encounter: 98.4 kg (217 lb).    Physical Exam  GENERAL: alert and no distress  EYES: Eyes grossly normal to inspection, PERRL and conjunctivae and sclerae normal  HENT: ear canals and TM's normal, nose and mouth without ulcers or lesions  RESP: lungs clear to auscultation - no rales, rhonchi or wheezes  CV: regular rate and rhythm, normal S1 S2, no S3 or S4, no murmur, click or rub, no peripheral edema  ABDOMEN: bowel sounds normal  MS: no gross musculoskeletal defects noted, no edema  NEURO: Normal strength and tone, mentation intact and speech normal  PSYCH: mentation appears normal, affect normal/bright        Signed Electronically by: Guerrero Mercado MD    "

## 2024-03-21 LAB
ALBUMIN SERPL BCG-MCNC: 4.9 G/DL (ref 3.5–5.2)
ALP SERPL-CCNC: 87 U/L (ref 40–150)
ALT SERPL W P-5'-P-CCNC: 46 U/L (ref 0–70)
ANION GAP SERPL CALCULATED.3IONS-SCNC: 12 MMOL/L (ref 7–15)
AST SERPL W P-5'-P-CCNC: 31 U/L (ref 0–45)
BILIRUB SERPL-MCNC: 0.7 MG/DL
BUN SERPL-MCNC: 14.1 MG/DL (ref 6–20)
CALCIUM SERPL-MCNC: 10.1 MG/DL (ref 8.6–10)
CHLORIDE SERPL-SCNC: 100 MMOL/L (ref 98–107)
CHOLEST SERPL-MCNC: 165 MG/DL
CREAT SERPL-MCNC: 1 MG/DL (ref 0.67–1.17)
DEPRECATED HCO3 PLAS-SCNC: 23 MMOL/L (ref 22–29)
EGFRCR SERPLBLD CKD-EPI 2021: 88 ML/MIN/1.73M2
FASTING STATUS PATIENT QL REPORTED: YES
GLUCOSE SERPL-MCNC: 113 MG/DL (ref 70–99)
HDLC SERPL-MCNC: 46 MG/DL
LDLC SERPL CALC-MCNC: 85 MG/DL
NONHDLC SERPL-MCNC: 119 MG/DL
POTASSIUM SERPL-SCNC: 4.5 MMOL/L (ref 3.4–5.3)
PROT SERPL-MCNC: 7.5 G/DL (ref 6.4–8.3)
PSA SERPL DL<=0.01 NG/ML-MCNC: 1.88 NG/ML (ref 0–3.5)
SODIUM SERPL-SCNC: 135 MMOL/L (ref 135–145)
TRIGL SERPL-MCNC: 169 MG/DL
TSH SERPL DL<=0.005 MIU/L-ACNC: 1.52 UIU/ML (ref 0.3–4.2)

## 2024-12-01 DIAGNOSIS — N52.9 ERECTILE DYSFUNCTION, UNSPECIFIED ERECTILE DYSFUNCTION TYPE: ICD-10-CM

## 2024-12-03 RX ORDER — SILDENAFIL CITRATE 20 MG/1
TABLET ORAL
Qty: 90 TABLET | Refills: 0 | Status: SHIPPED | OUTPATIENT
Start: 2024-12-03

## 2025-02-18 ENCOUNTER — PATIENT OUTREACH (OUTPATIENT)
Dept: CARE COORDINATION | Facility: CLINIC | Age: 60
End: 2025-02-18
Payer: COMMERCIAL

## 2025-03-04 ENCOUNTER — PATIENT OUTREACH (OUTPATIENT)
Dept: CARE COORDINATION | Facility: CLINIC | Age: 60
End: 2025-03-04
Payer: COMMERCIAL

## 2025-04-01 SDOH — HEALTH STABILITY: PHYSICAL HEALTH: ON AVERAGE, HOW MANY MINUTES DO YOU ENGAGE IN EXERCISE AT THIS LEVEL?: 40 MIN

## 2025-04-01 SDOH — HEALTH STABILITY: PHYSICAL HEALTH: ON AVERAGE, HOW MANY DAYS PER WEEK DO YOU ENGAGE IN MODERATE TO STRENUOUS EXERCISE (LIKE A BRISK WALK)?: 6 DAYS

## 2025-04-01 ASSESSMENT — SOCIAL DETERMINANTS OF HEALTH (SDOH): HOW OFTEN DO YOU GET TOGETHER WITH FRIENDS OR RELATIVES?: TWICE A WEEK

## 2025-04-02 ENCOUNTER — OFFICE VISIT (OUTPATIENT)
Dept: FAMILY MEDICINE | Facility: CLINIC | Age: 60
End: 2025-04-02
Payer: COMMERCIAL

## 2025-04-02 VITALS
BODY MASS INDEX: 31.7 KG/M2 | HEIGHT: 69 IN | SYSTOLIC BLOOD PRESSURE: 162 MMHG | RESPIRATION RATE: 18 BRPM | TEMPERATURE: 98.1 F | WEIGHT: 214 LBS | DIASTOLIC BLOOD PRESSURE: 91 MMHG | OXYGEN SATURATION: 98 % | HEART RATE: 89 BPM

## 2025-04-02 DIAGNOSIS — J30.2 SEASONAL ALLERGIES: ICD-10-CM

## 2025-04-02 DIAGNOSIS — I10 ESSENTIAL HYPERTENSION, BENIGN: Primary | ICD-10-CM

## 2025-04-02 DIAGNOSIS — E78.2 MIXED HYPERLIPIDEMIA: ICD-10-CM

## 2025-04-02 DIAGNOSIS — Z00.00 HEALTH CARE MAINTENANCE: ICD-10-CM

## 2025-04-02 DIAGNOSIS — N52.9 ERECTILE DYSFUNCTION, UNSPECIFIED ERECTILE DYSFUNCTION TYPE: ICD-10-CM

## 2025-04-02 DIAGNOSIS — J30.2 SEASONAL ALLERGIC RHINITIS, UNSPECIFIED TRIGGER: ICD-10-CM

## 2025-04-02 DIAGNOSIS — Z12.5 SCREENING FOR PROSTATE CANCER: ICD-10-CM

## 2025-04-02 DIAGNOSIS — Z86.19 H/O COLD SORES: ICD-10-CM

## 2025-04-02 LAB
ALBUMIN SERPL BCG-MCNC: 4.8 G/DL (ref 3.5–5.2)
ALP SERPL-CCNC: 79 U/L (ref 40–150)
ALT SERPL W P-5'-P-CCNC: 42 U/L (ref 0–70)
ANION GAP SERPL CALCULATED.3IONS-SCNC: 14 MMOL/L (ref 7–15)
AST SERPL W P-5'-P-CCNC: 30 U/L (ref 0–45)
BILIRUB SERPL-MCNC: 0.7 MG/DL
BUN SERPL-MCNC: 16.4 MG/DL (ref 6–20)
CALCIUM SERPL-MCNC: 9.6 MG/DL (ref 8.8–10.4)
CHLORIDE SERPL-SCNC: 103 MMOL/L (ref 98–107)
CHOLEST SERPL-MCNC: 173 MG/DL
CREAT SERPL-MCNC: 0.95 MG/DL (ref 0.67–1.17)
EGFRCR SERPLBLD CKD-EPI 2021: >90 ML/MIN/1.73M2
ERYTHROCYTE [DISTWIDTH] IN BLOOD BY AUTOMATED COUNT: 13.6 % (ref 10–15)
FASTING STATUS PATIENT QL REPORTED: YES
FASTING STATUS PATIENT QL REPORTED: YES
GLUCOSE SERPL-MCNC: 126 MG/DL (ref 70–99)
HCO3 SERPL-SCNC: 21 MMOL/L (ref 22–29)
HCT VFR BLD AUTO: 46.7 % (ref 40–53)
HDLC SERPL-MCNC: 42 MG/DL
HGB BLD-MCNC: 16.3 G/DL (ref 13.3–17.7)
LDLC SERPL CALC-MCNC: 58 MG/DL
MCH RBC QN AUTO: 32 PG (ref 26.5–33)
MCHC RBC AUTO-ENTMCNC: 34.9 G/DL (ref 31.5–36.5)
MCV RBC AUTO: 92 FL (ref 78–100)
NONHDLC SERPL-MCNC: 131 MG/DL
PLATELET # BLD AUTO: 312 10E3/UL (ref 150–450)
POTASSIUM SERPL-SCNC: 4.1 MMOL/L (ref 3.4–5.3)
PROT SERPL-MCNC: 7.6 G/DL (ref 6.4–8.3)
PSA SERPL DL<=0.01 NG/ML-MCNC: 1.9 NG/ML (ref 0–3.5)
RBC # BLD AUTO: 5.1 10E6/UL (ref 4.4–5.9)
SODIUM SERPL-SCNC: 138 MMOL/L (ref 135–145)
TRIGL SERPL-MCNC: 365 MG/DL
WBC # BLD AUTO: 5.2 10E3/UL (ref 4–11)

## 2025-04-02 PROCEDURE — 3077F SYST BP >= 140 MM HG: CPT | Performed by: FAMILY MEDICINE

## 2025-04-02 PROCEDURE — 99214 OFFICE O/P EST MOD 30 MIN: CPT | Mod: 25 | Performed by: FAMILY MEDICINE

## 2025-04-02 PROCEDURE — 80061 LIPID PANEL: CPT | Performed by: FAMILY MEDICINE

## 2025-04-02 PROCEDURE — 3079F DIAST BP 80-89 MM HG: CPT | Performed by: FAMILY MEDICINE

## 2025-04-02 PROCEDURE — G0103 PSA SCREENING: HCPCS | Performed by: FAMILY MEDICINE

## 2025-04-02 PROCEDURE — 99396 PREV VISIT EST AGE 40-64: CPT | Mod: 25 | Performed by: FAMILY MEDICINE

## 2025-04-02 PROCEDURE — 85027 COMPLETE CBC AUTOMATED: CPT | Performed by: FAMILY MEDICINE

## 2025-04-02 PROCEDURE — G2211 COMPLEX E/M VISIT ADD ON: HCPCS | Performed by: FAMILY MEDICINE

## 2025-04-02 PROCEDURE — 36415 COLL VENOUS BLD VENIPUNCTURE: CPT | Performed by: FAMILY MEDICINE

## 2025-04-02 PROCEDURE — 80053 COMPREHEN METABOLIC PANEL: CPT | Performed by: FAMILY MEDICINE

## 2025-04-02 PROCEDURE — 90677 PCV20 VACCINE IM: CPT | Performed by: FAMILY MEDICINE

## 2025-04-02 PROCEDURE — 90471 IMMUNIZATION ADMIN: CPT | Performed by: FAMILY MEDICINE

## 2025-04-02 RX ORDER — MONTELUKAST SODIUM 10 MG/1
1 TABLET ORAL DAILY
Qty: 90 TABLET | Refills: 3 | Status: SHIPPED | OUTPATIENT
Start: 2025-04-02

## 2025-04-02 RX ORDER — VALACYCLOVIR HYDROCHLORIDE 1 G/1
1000 TABLET, FILM COATED ORAL 2 TIMES DAILY
Qty: 30 TABLET | Refills: 0 | Status: SHIPPED | OUTPATIENT
Start: 2025-04-02

## 2025-04-02 RX ORDER — FLUTICASONE PROPIONATE 50 MCG
1 SPRAY, SUSPENSION (ML) NASAL DAILY
Qty: 18.2 ML | Refills: 1 | Status: SHIPPED | OUTPATIENT
Start: 2025-04-02

## 2025-04-02 RX ORDER — SILDENAFIL CITRATE 20 MG/1
TABLET ORAL
Qty: 90 TABLET | Refills: 0 | Status: SHIPPED | OUTPATIENT
Start: 2025-04-02

## 2025-04-02 RX ORDER — EZETIMIBE 10 MG/1
10 TABLET ORAL DAILY
Qty: 90 TABLET | Refills: 3 | Status: SHIPPED | OUTPATIENT
Start: 2025-04-02

## 2025-04-02 RX ORDER — LISINOPRIL 40 MG/1
40 TABLET ORAL DAILY
Qty: 90 TABLET | Refills: 3 | Status: SHIPPED | OUTPATIENT
Start: 2025-04-02

## 2025-04-02 RX ORDER — AMLODIPINE BESYLATE 10 MG/1
10 TABLET ORAL DAILY
Qty: 90 TABLET | Refills: 3 | Status: SHIPPED | OUTPATIENT
Start: 2025-04-02

## 2025-04-02 NOTE — PROGRESS NOTES
Preventive Care Visit  [facility]  Guerrero Mercado MD, Family Medicine  Apr 2, 2025      Assessment & Plan     Health care maintenance  Patient interested in fasting blood work  Update with pneumococcal 20 today  - Lipid panel reflex to direct LDL Non-fasting  - Pneumococcal 20 Valent Conjugate (PCV20)  - PSA, screen  - Comprehensive metabolic panel (BMP + Alb, Alk Phos, ALT, AST, Total. Bili, TP)  - CBC with platelets  - amLODIPine (NORVASC) 10 MG tablet  Dispense: 90 tablet; Refill: 3    Essential hypertension, benign  Blood pressure elevated  Patient feels he is a from [employer] hypertension  He will monitor blood pressures at home over the next couple weeks and follow back up  If blood pressure remains elevated heart rate sustainable suggest starting low-dose beta-blocker in combination with amlodipine and lisinopril  - lisinopril (ZESTRIL) 40 MG tablet  Dispense: 90 tablet; Refill: 3    Screening for prostate cancer  Continue screen for prostate cancer with PSA testing  - PSA, screen    Mixed hyperlipidemia  Patient on Zetia for cholesterol control check lipid levels  - ezetimibe (ZETIA) 10 MG tablet  Dispense: 90 tablet; Refill: 3    Seasonal allergies  Patient still in the springtime.  He is having problems discussed continuation of Singulair but adding in Flonase  - fluticasone (FLONASE) 50 MCG/ACT nasal spray  Dispense: 18.2 mL; Refill: 1    Erectile dysfunction, unspecified erectile dysfunction type  Patient requesting refill of sildenafil  - sildenafil (REVATIO) 20 MG tablet  Dispense: 90 tablet; Refill: 0    H/O cold sores  History of cold sores periodically throughout the year would like to have prescription on hand Valtrex  - valACYclovir (VALTREX) 1000 mg tablet  Dispense: 30 tablet; Refill: 0    Seasonal allergic rhinitis, unspecified trigger  Continue with Singulair for seasonal allergies add Flonase  - montelukast (SINGULAIR) 10 MG tablet  Dispense: 90 tablet;  "Refill: 3      Patient has been advised of split billing requirements and indicates understanding: Yes        Nicotine/Tobacco Cessation  He reports that he has been smoking cigars. He has quit using smokeless tobacco.  Nicotine/Tobacco Cessation Plan  Self help information given to patient      BMI  Estimated body mass index is 31.37 kg/m  as calculated from the following:    Height as of this encounter: 1.759 m (5' 9.25\").    Weight as of this encounter: 97.1 kg (214 lb).   Weight management plan: Discussed healthy diet and exercise guidelines    Counseling  Appropriate preventive services were addressed with this patient via screening, questionnaire, or discussion as appropriate for fall prevention, nutrition, physical activity, Tobacco-use cessation, social engagement, weight loss and cognition.  Checklist reviewing preventive services available has been given to the patient.  Reviewed patient's diet, addressing concerns and/or questions.           Porsche Salcedo is a 59 year old, presenting for the following:  Physical (Fasting, no concerns)        4/2/2025     7:18 AM   Additional Questions   Roomed by Alma Delia MEHTA CMA          HPI  Patient is here for annual exam  Patient's blood pressure is elevated and he feels he has whitecoat hypertension he like to monitor at home prior to adding a third agent we discussed metoprolol if blood pressure remains elevated.  He has fasting today we will get fasting blood work and update with pneumococcal 20 today.  Up-to-date on colon cancer screening.  Patient has a history of cold sores like to have Valtrex on hand which has worked well for him in the past.  More problems in the spring with his allergies not controlled with just Singulair we discussed adding Flonase.  Currently on Zetia for cholesterol control will check lipid levels.  Using sildenafil for ED refill sent to the pharmacy.  Continue screen for prostate cancer.        Advance Care Planning  Patient does not " have a Health Care Directive: Discussed advance care planning with patient; however, patient declined at this time.      4/1/2025   General Health   How would you rate your overall physical health? Excellent   Feel stress (tense, anxious, or unable to sleep) Not at all         4/1/2025   Nutrition   Three or more servings of calcium each day? Yes   Diet: Regular (no restrictions)   How many servings of fruit and vegetables per day? 4 or more   How many sweetened beverages each day? 0-1         4/1/2025   Exercise   Days per week of moderate/strenous exercise 6 days   Average minutes spent exercising at this level 40 min         4/1/2025   Social Factors   Frequency of gathering with friends or relatives Twice a week   Worry food won't last until get money to buy more No   Food not last or not have enough money for food? No   Do you have housing? (Housing is defined as stable permanent housing and does not include staying ouside in a car, in a tent, in an abandoned building, in an overnight shelter, or couch-surfing.) Yes   Are you worried about losing your housing? No   Lack of transportation? No   Unable to get utilities (heat,electricity)? No         4/1/2025   Fall Risk   Fallen 2 or more times in the past year? No   Trouble with walking or balance? No          4/1/2025   Dental   Dentist two times every year? Yes           3/13/2024   TB Screening   Were you born outside of the [place]? No           Today's PHQ-2 Score:       4/1/2025     3:19 PM   PHQ-2 ( 1999 [employer])   Q1: Little interest or pleasure in doing things 0   Q2: Feeling down, depressed or hopeless 0   PHQ-2 Score 0    Q1: Little interest or pleasure in doing things Not at all   Q2: Feeling down, depressed or hopeless Not at all   PHQ-2 Score 0       Patient-reported           4/1/2025   Substance Use   Alcohol more than 3/day or more than 7/wk No   Do you use any other substances recreationally? No     Social History     Tobacco Use    Smoking status:  "Some Days     Types: Cigars    Smokeless tobacco: Former   Vaping Use    Vaping status: Never Used   Substance Use Topics    Alcohol use: Yes     Comment: Alcoholic Drinks/day: Social            4/1/2025   STI Screening   New sexual partner(s) since last STI/HIV test? No   Last PSA:   Prostate Specific Antigen Screen   Date Value Ref Range Status   03/20/2024 1.88 0.00 - 3.50 ng/mL Final   06/22/2022 2.91 0.00 - 3.50 ug/L Final     ASCVD Risk   The 10-year ASCVD risk score (Jaqueline BERKOWITZ, et al., 2019) is: 17.3%    Values used to calculate the score:      Age: 59 years      Sex: Male      Is Non- : No      Diabetic: No      Tobacco smoker: Yes      Systolic Blood Pressure: 155 mmHg      Is BP treated: Yes      HDL Cholesterol: 46 mg/dL      Total Cholesterol: 165 mg/dL           Reviewed and updated as needed this visit by Provider                             Objective    Exam  BP (!) 155/89 (BP Location: Left arm, Patient Position: Sitting, Cuff Size: Adult Large)   Pulse 89   Temp 98.1  F (36.7  C) (Oral)   Resp 18   Ht 1.759 m (5' 9.25\")   Wt 97.1 kg (214 lb)   SpO2 98%   BMI 31.37 kg/m     Estimated body mass index is 31.37 kg/m  as calculated from the following:    Height as of this encounter: 1.759 m (5' 9.25\").    Weight as of this encounter: 97.1 kg (214 lb).    Physical Exam  GENERAL: alert and no distress  EYES: Eyes grossly normal to inspection, PERRL and conjunctivae and sclerae normal  HENT: ear canals and TM's normal, nose and mouth without ulcers or lesions  RESP: lungs clear to auscultation - no rales, rhonchi or wheezes  CV: regular rate and rhythm, normal S1 S2, no S3 or S4, no murmur, click or rub, no peripheral edema  MS: no gross musculoskeletal defects noted, no edema  NEURO: Normal strength and tone, mentation intact and speech normal  PSYCH: mentation appears normal, affect normal/bright      The longitudinal plan of care for the diagnosis(es)/condition(s) " as documented were addressed during this visit. Due to the added complexity in care, I will continue to support Matias in the subsequent management and with ongoing continuity of care.  Signed Electronically by: Guerrero Mercado MD

## 2025-04-09 DIAGNOSIS — R73.9 HYPERGLYCEMIA: Primary | ICD-10-CM

## 2025-06-20 ENCOUNTER — TELEPHONE (OUTPATIENT)
Dept: FAMILY MEDICINE | Facility: CLINIC | Age: 60
End: 2025-06-20
Payer: COMMERCIAL

## 2025-06-20 DIAGNOSIS — E78.2 MIXED HYPERLIPIDEMIA: ICD-10-CM

## 2025-06-20 DIAGNOSIS — N52.9 ERECTILE DYSFUNCTION, UNSPECIFIED ERECTILE DYSFUNCTION TYPE: ICD-10-CM

## 2025-06-20 NOTE — TELEPHONE ENCOUNTER
General Call    Contacts       Contact Date/Time Type Contact Phone/Fax    06/20/2025 12:29 PM CDT Phone (Incoming) Ralph Matias MAURICIO (Self) [phone] (M)          Reason for Call:  Pt called in and wanted to inform Dr. Mercado that his blood pressure is still continuing to be elevated and pt discussed with provider an alternative medication for Blood pressure.     Pt would like to try another prescription- pt is currently on Lisinopril.         Could we send this information to you in [email or web address] or would you prefer to receive a phone call?:   Patient would prefer a phone call   Okay to leave a detailed message?: Yes at Cell number on file:    Telephone Information:   Mobile [phone]

## 2025-06-21 RX ORDER — SILDENAFIL CITRATE 20 MG/1
20 TABLET ORAL DAILY
Qty: 90 TABLET | Refills: 0 | Status: SHIPPED | OUTPATIENT
Start: 2025-06-21

## 2025-06-21 NOTE — TELEPHONE ENCOUNTER
Please confirm patient is taking both lisinopril and amlodipine- if so and blood pressure running high- please see what levels has he been seeing? We will add a third agent if running elevated.

## 2025-06-23 NOTE — TELEPHONE ENCOUNTER
Called and spoke to pt, he confirms taking 40 mg lisinopril with 10 mg amlodipine, pt confirmed its been running high, consistent with what he had in office in 150's, he said the bp cuff has been blinking, pt stated he just refilled medications and they wouldn't refill ezetimibe, pt was asking for refill on that medication

## 2025-06-24 RX ORDER — EZETIMIBE 10 MG/1
10 TABLET ORAL DAILY
Qty: 90 TABLET | Refills: 2 | Status: SHIPPED | OUTPATIENT
Start: 2025-06-24

## 2025-06-24 NOTE — TELEPHONE ENCOUNTER
Patient is on max dosing of the 2 medications so his blood pressure is running high we will need to add a third agent I would suggest a low-dose beta-blocker called metoprolol.  If he is in agreement I will send to the pharmacy.

## 2025-06-24 NOTE — TELEPHONE ENCOUNTER
Left detailed message for patient with Dr. Mercado's recommendations below. Patient to call back to confirm if he wants to start metoprolol. Please verify pharmacy with patient when he calls back.

## 2025-06-25 DIAGNOSIS — I10 ESSENTIAL HYPERTENSION: Primary | ICD-10-CM

## 2025-06-25 RX ORDER — METOPROLOL SUCCINATE 25 MG/1
25 TABLET, EXTENDED RELEASE ORAL DAILY
Qty: 30 TABLET | Refills: 1 | Status: SHIPPED | OUTPATIENT
Start: 2025-06-25

## 2025-06-25 NOTE — TELEPHONE ENCOUNTER
Reached out to patient and relayed note from PCP below. Patient scheduled for bp check    Nahum Vela RN     [facility]

## 2025-06-25 NOTE — TELEPHONE ENCOUNTER
Is inform patient that medication metoprolol sent to the pharmacy  Please have him start the medication recommend nurse blood pressure check in 10 days

## 2025-07-07 ENCOUNTER — ALLIED HEALTH/NURSE VISIT (OUTPATIENT)
Dept: FAMILY MEDICINE | Facility: CLINIC | Age: 60
End: 2025-07-07
Payer: COMMERCIAL

## 2025-07-07 VITALS — DIASTOLIC BLOOD PRESSURE: 84 MMHG | SYSTOLIC BLOOD PRESSURE: 153 MMHG | HEART RATE: 85 BPM

## 2025-07-07 DIAGNOSIS — Z01.30 BLOOD PRESSURE CHECK: Primary | ICD-10-CM

## 2025-07-07 PROCEDURE — 3077F SYST BP >= 140 MM HG: CPT

## 2025-07-07 PROCEDURE — 3079F DIAST BP 80-89 MM HG: CPT

## 2025-07-07 PROCEDURE — 99207 PR NO CHARGE NURSE ONLY: CPT

## 2025-08-05 DIAGNOSIS — I10 ESSENTIAL HYPERTENSION: ICD-10-CM

## 2025-08-05 RX ORDER — METOPROLOL SUCCINATE 25 MG/1
25 TABLET, EXTENDED RELEASE ORAL DAILY
Qty: 90 TABLET | Refills: 0 | Status: SHIPPED | OUTPATIENT
Start: 2025-08-05